# Patient Record
Sex: FEMALE | Race: WHITE | NOT HISPANIC OR LATINO | Employment: FULL TIME | URBAN - METROPOLITAN AREA
[De-identification: names, ages, dates, MRNs, and addresses within clinical notes are randomized per-mention and may not be internally consistent; named-entity substitution may affect disease eponyms.]

---

## 2021-10-12 ENCOUNTER — HOSPITAL ENCOUNTER (EMERGENCY)
Facility: CLINIC | Age: 56
Discharge: ANOTHER HOSPITAL | End: 2021-10-13
Attending: EMERGENCY MEDICINE | Admitting: EMERGENCY MEDICINE
Payer: COMMERCIAL

## 2021-10-12 DIAGNOSIS — F10.920 ALCOHOLIC INTOXICATION WITHOUT COMPLICATION (H): ICD-10-CM

## 2021-10-12 DIAGNOSIS — F10.99 ALCOHOL-RELATED DISORDER (H): ICD-10-CM

## 2021-10-12 DIAGNOSIS — F41.9 ANXIETY DISORDER: ICD-10-CM

## 2021-10-12 DIAGNOSIS — F32.A DEPRESSIVE DISORDER: ICD-10-CM

## 2021-10-12 LAB
ALCOHOL BREATH TEST: 0.22 (ref 0–0.01)
SARS-COV-2 RNA RESP QL NAA+PROBE: NEGATIVE

## 2021-10-12 PROCEDURE — 82075 ASSAY OF BREATH ETHANOL: CPT

## 2021-10-12 PROCEDURE — 250N000013 HC RX MED GY IP 250 OP 250 PS 637: Performed by: EMERGENCY MEDICINE

## 2021-10-12 PROCEDURE — C9803 HOPD COVID-19 SPEC COLLECT: HCPCS

## 2021-10-12 PROCEDURE — U0003 INFECTIOUS AGENT DETECTION BY NUCLEIC ACID (DNA OR RNA); SEVERE ACUTE RESPIRATORY SYNDROME CORONAVIRUS 2 (SARS-COV-2) (CORONAVIRUS DISEASE [COVID-19]), AMPLIFIED PROBE TECHNIQUE, MAKING USE OF HIGH THROUGHPUT TECHNOLOGIES AS DESCRIBED BY CMS-2020-01-R: HCPCS | Performed by: EMERGENCY MEDICINE

## 2021-10-12 PROCEDURE — 90791 PSYCH DIAGNOSTIC EVALUATION: CPT

## 2021-10-12 PROCEDURE — 99285 EMERGENCY DEPT VISIT HI MDM: CPT | Mod: 25

## 2021-10-12 RX ORDER — HYDROXYZINE HYDROCHLORIDE 25 MG/1
50 TABLET, FILM COATED ORAL ONCE
Status: COMPLETED | OUTPATIENT
Start: 2021-10-12 | End: 2021-10-12

## 2021-10-12 RX ADMIN — HYDROXYZINE HYDROCHLORIDE 50 MG: 25 TABLET, FILM COATED ORAL at 23:06

## 2021-10-12 ASSESSMENT — MIFFLIN-ST. JEOR: SCORE: 1658.63

## 2021-10-13 ENCOUNTER — HOSPITAL ENCOUNTER (INPATIENT)
Facility: CLINIC | Age: 56
LOS: 2 days | Discharge: PSYCHIATRIC OR SUBSTANCE USE FACILITY | DRG: 897 | End: 2021-10-15
Attending: PSYCHIATRY & NEUROLOGY | Admitting: PSYCHIATRY & NEUROLOGY
Payer: COMMERCIAL

## 2021-10-13 VITALS
SYSTOLIC BLOOD PRESSURE: 163 MMHG | BODY MASS INDEX: 40.97 KG/M2 | RESPIRATION RATE: 16 BRPM | HEIGHT: 64 IN | DIASTOLIC BLOOD PRESSURE: 89 MMHG | WEIGHT: 240 LBS | OXYGEN SATURATION: 100 % | TEMPERATURE: 98 F | HEART RATE: 102 BPM

## 2021-10-13 DIAGNOSIS — I10 PRIMARY HYPERTENSION: ICD-10-CM

## 2021-10-13 DIAGNOSIS — F33.0 MILD EPISODE OF RECURRENT MAJOR DEPRESSIVE DISORDER (H): ICD-10-CM

## 2021-10-13 DIAGNOSIS — F10.939 ALCOHOL WITHDRAWAL SYNDROME WITH COMPLICATION (H): ICD-10-CM

## 2021-10-13 DIAGNOSIS — G89.4 CHRONIC PAIN SYNDROME: Primary | ICD-10-CM

## 2021-10-13 LAB
ALBUMIN SERPL-MCNC: 3.5 G/DL (ref 3.4–5)
ALP SERPL-CCNC: 105 U/L (ref 40–150)
ALT SERPL W P-5'-P-CCNC: 73 U/L (ref 0–50)
AMPHETAMINES UR QL SCN: NORMAL
ANION GAP SERPL CALCULATED.3IONS-SCNC: 12 MMOL/L (ref 3–14)
AST SERPL W P-5'-P-CCNC: 47 U/L (ref 0–45)
BARBITURATES UR QL: NORMAL
BASOPHILS # BLD AUTO: 0 10E3/UL (ref 0–0.2)
BASOPHILS NFR BLD AUTO: 0 %
BENZODIAZ UR QL: NORMAL
BILIRUB SERPL-MCNC: 0.4 MG/DL (ref 0.2–1.3)
BUN SERPL-MCNC: 8 MG/DL (ref 7–30)
CALCIUM SERPL-MCNC: 8.5 MG/DL (ref 8.5–10.1)
CANNABINOIDS UR QL SCN: NORMAL
CHLORIDE BLD-SCNC: 99 MMOL/L (ref 94–109)
CO2 SERPL-SCNC: 25 MMOL/L (ref 20–32)
COCAINE UR QL: NORMAL
CREAT SERPL-MCNC: 0.76 MG/DL (ref 0.52–1.04)
EOSINOPHIL # BLD AUTO: 0 10E3/UL (ref 0–0.7)
EOSINOPHIL NFR BLD AUTO: 1 %
ERYTHROCYTE [DISTWIDTH] IN BLOOD BY AUTOMATED COUNT: 13.4 % (ref 10–15)
GFR SERPL CREATININE-BSD FRML MDRD: 87 ML/MIN/1.73M2
GLUCOSE BLD-MCNC: 83 MG/DL (ref 70–99)
HCG UR QL: NEGATIVE
HCT VFR BLD AUTO: 40 % (ref 35–47)
HGB BLD-MCNC: 13.2 G/DL (ref 11.7–15.7)
IMM GRANULOCYTES # BLD: 0 10E3/UL
IMM GRANULOCYTES NFR BLD: 1 %
LYMPHOCYTES # BLD AUTO: 1.7 10E3/UL (ref 0.8–5.3)
LYMPHOCYTES NFR BLD AUTO: 31 %
MCH RBC QN AUTO: 28 PG (ref 26.5–33)
MCHC RBC AUTO-ENTMCNC: 33 G/DL (ref 31.5–36.5)
MCV RBC AUTO: 85 FL (ref 78–100)
MONOCYTES # BLD AUTO: 0.4 10E3/UL (ref 0–1.3)
MONOCYTES NFR BLD AUTO: 8 %
NEUTROPHILS # BLD AUTO: 3.2 10E3/UL (ref 1.6–8.3)
NEUTROPHILS NFR BLD AUTO: 59 %
NRBC # BLD AUTO: 0 10E3/UL
NRBC BLD AUTO-RTO: 0 /100
OPIATES UR QL SCN: NORMAL
PCP UR QL SCN: NORMAL
PLATELET # BLD AUTO: 325 10E3/UL (ref 150–450)
POTASSIUM BLD-SCNC: 3.1 MMOL/L (ref 3.4–5.3)
PROT SERPL-MCNC: 7.6 G/DL (ref 6.8–8.8)
RBC # BLD AUTO: 4.72 10E6/UL (ref 3.8–5.2)
SODIUM SERPL-SCNC: 136 MMOL/L (ref 133–144)
WBC # BLD AUTO: 5.4 10E3/UL (ref 4–11)

## 2021-10-13 PROCEDURE — HZ2ZZZZ DETOXIFICATION SERVICES FOR SUBSTANCE ABUSE TREATMENT: ICD-10-PCS | Performed by: PSYCHIATRY & NEUROLOGY

## 2021-10-13 PROCEDURE — 250N000013 HC RX MED GY IP 250 OP 250 PS 637: Performed by: EMERGENCY MEDICINE

## 2021-10-13 PROCEDURE — H2032 ACTIVITY THERAPY, PER 15 MIN: HCPCS

## 2021-10-13 PROCEDURE — 85025 COMPLETE CBC W/AUTO DIFF WBC: CPT | Performed by: EMERGENCY MEDICINE

## 2021-10-13 PROCEDURE — 80307 DRUG TEST PRSMV CHEM ANLYZR: CPT | Performed by: EMERGENCY MEDICINE

## 2021-10-13 PROCEDURE — 36415 COLL VENOUS BLD VENIPUNCTURE: CPT | Performed by: EMERGENCY MEDICINE

## 2021-10-13 PROCEDURE — 128N000004 HC R&B CD ADULT

## 2021-10-13 PROCEDURE — 250N000013 HC RX MED GY IP 250 OP 250 PS 637: Performed by: PSYCHIATRY & NEUROLOGY

## 2021-10-13 PROCEDURE — 99222 1ST HOSP IP/OBS MODERATE 55: CPT | Mod: AI | Performed by: PSYCHIATRY & NEUROLOGY

## 2021-10-13 PROCEDURE — 81025 URINE PREGNANCY TEST: CPT | Performed by: EMERGENCY MEDICINE

## 2021-10-13 PROCEDURE — 80053 COMPREHEN METABOLIC PANEL: CPT | Performed by: EMERGENCY MEDICINE

## 2021-10-13 RX ORDER — ENALAPRIL MALEATE 20 MG/1
40 TABLET ORAL DAILY
Status: DISCONTINUED | OUTPATIENT
Start: 2021-10-14 | End: 2021-10-15 | Stop reason: HOSPADM

## 2021-10-13 RX ORDER — HYDROXYZINE HYDROCHLORIDE 25 MG/1
25 TABLET, FILM COATED ORAL EVERY 6 HOURS PRN
Status: DISCONTINUED | OUTPATIENT
Start: 2021-10-13 | End: 2021-10-13 | Stop reason: HOSPADM

## 2021-10-13 RX ORDER — POTASSIUM CHLORIDE 1500 MG/1
40 TABLET, EXTENDED RELEASE ORAL ONCE
Status: COMPLETED | OUTPATIENT
Start: 2021-10-13 | End: 2021-10-13

## 2021-10-13 RX ORDER — ACETAMINOPHEN 500 MG
1000 TABLET ORAL ONCE
Status: COMPLETED | OUTPATIENT
Start: 2021-10-13 | End: 2021-10-13

## 2021-10-13 RX ORDER — IBUPROFEN 600 MG/1
600 TABLET, FILM COATED ORAL ONCE
Status: COMPLETED | OUTPATIENT
Start: 2021-10-13 | End: 2021-10-13

## 2021-10-13 RX ORDER — HYDROCHLOROTHIAZIDE 25 MG/1
25 TABLET ORAL DAILY
Status: DISCONTINUED | OUTPATIENT
Start: 2021-10-14 | End: 2021-10-15 | Stop reason: HOSPADM

## 2021-10-13 RX ORDER — MULTIPLE VITAMINS W/ MINERALS TAB 9MG-400MCG
1 TAB ORAL DAILY
Status: DISCONTINUED | OUTPATIENT
Start: 2021-10-13 | End: 2021-10-15 | Stop reason: HOSPADM

## 2021-10-13 RX ORDER — ATENOLOL 50 MG/1
50 TABLET ORAL DAILY PRN
Status: DISCONTINUED | OUTPATIENT
Start: 2021-10-13 | End: 2021-10-15 | Stop reason: HOSPADM

## 2021-10-13 RX ORDER — TRAZODONE HYDROCHLORIDE 50 MG/1
50 TABLET, FILM COATED ORAL
Status: DISCONTINUED | OUTPATIENT
Start: 2021-10-13 | End: 2021-10-15 | Stop reason: HOSPADM

## 2021-10-13 RX ORDER — LORAZEPAM 1 MG/1
1 TABLET ORAL ONCE
Status: COMPLETED | OUTPATIENT
Start: 2021-10-13 | End: 2021-10-13

## 2021-10-13 RX ORDER — ACETAMINOPHEN 325 MG/1
650 TABLET ORAL EVERY 4 HOURS PRN
Status: DISCONTINUED | OUTPATIENT
Start: 2021-10-13 | End: 2021-10-15 | Stop reason: HOSPADM

## 2021-10-13 RX ORDER — HYDROCHLOROTHIAZIDE 25 MG/1
25 TABLET ORAL DAILY
Status: DISCONTINUED | OUTPATIENT
Start: 2021-10-13 | End: 2021-10-13 | Stop reason: HOSPADM

## 2021-10-13 RX ORDER — FOLIC ACID 1 MG/1
1 TABLET ORAL DAILY
Status: DISCONTINUED | OUTPATIENT
Start: 2021-10-13 | End: 2021-10-15 | Stop reason: HOSPADM

## 2021-10-13 RX ORDER — GABAPENTIN 300 MG/1
600 CAPSULE ORAL AT BEDTIME
Status: DISCONTINUED | OUTPATIENT
Start: 2021-10-13 | End: 2021-10-15 | Stop reason: HOSPADM

## 2021-10-13 RX ORDER — DIAZEPAM 5 MG
5-20 TABLET ORAL EVERY 30 MIN PRN
Status: DISCONTINUED | OUTPATIENT
Start: 2021-10-13 | End: 2021-10-15 | Stop reason: HOSPADM

## 2021-10-13 RX ORDER — LANOLIN ALCOHOL/MO/W.PET/CERES
100 CREAM (GRAM) TOPICAL DAILY
Status: DISCONTINUED | OUTPATIENT
Start: 2021-10-13 | End: 2021-10-15 | Stop reason: HOSPADM

## 2021-10-13 RX ORDER — LOPERAMIDE HCL 2 MG
2 CAPSULE ORAL 4 TIMES DAILY PRN
Status: DISCONTINUED | OUTPATIENT
Start: 2021-10-13 | End: 2021-10-15 | Stop reason: HOSPADM

## 2021-10-13 RX ORDER — CAPSAICIN 0.025 %
CREAM (GRAM) TOPICAL 2 TIMES DAILY PRN
Status: DISCONTINUED | OUTPATIENT
Start: 2021-10-13 | End: 2021-10-15 | Stop reason: HOSPADM

## 2021-10-13 RX ORDER — MAGNESIUM HYDROXIDE/ALUMINUM HYDROXICE/SIMETHICONE 120; 1200; 1200 MG/30ML; MG/30ML; MG/30ML
30 SUSPENSION ORAL EVERY 4 HOURS PRN
Status: DISCONTINUED | OUTPATIENT
Start: 2021-10-13 | End: 2021-10-15 | Stop reason: HOSPADM

## 2021-10-13 RX ORDER — ONDANSETRON 4 MG/1
4 TABLET, ORALLY DISINTEGRATING ORAL EVERY 6 HOURS PRN
Status: DISCONTINUED | OUTPATIENT
Start: 2021-10-13 | End: 2021-10-15 | Stop reason: HOSPADM

## 2021-10-13 RX ORDER — BACLOFEN 10 MG/1
10 TABLET ORAL 3 TIMES DAILY
Status: DISCONTINUED | OUTPATIENT
Start: 2021-10-13 | End: 2021-10-15 | Stop reason: HOSPADM

## 2021-10-13 RX ORDER — ENALAPRIL MALEATE 20 MG/1
40 TABLET ORAL DAILY
Status: DISCONTINUED | OUTPATIENT
Start: 2021-10-13 | End: 2021-10-13 | Stop reason: HOSPADM

## 2021-10-13 RX ORDER — LORAZEPAM 2 MG/1
2 TABLET ORAL ONCE
Status: COMPLETED | OUTPATIENT
Start: 2021-10-13 | End: 2021-10-13

## 2021-10-13 RX ORDER — HYDROXYZINE HYDROCHLORIDE 25 MG/1
25 TABLET, FILM COATED ORAL EVERY 4 HOURS PRN
Status: DISCONTINUED | OUTPATIENT
Start: 2021-10-13 | End: 2021-10-15 | Stop reason: HOSPADM

## 2021-10-13 RX ORDER — AMOXICILLIN 250 MG
1 CAPSULE ORAL 2 TIMES DAILY PRN
Status: DISCONTINUED | OUTPATIENT
Start: 2021-10-13 | End: 2021-10-15 | Stop reason: HOSPADM

## 2021-10-13 RX ADMIN — MULTIPLE VITAMINS W/ MINERALS TAB 1 TABLET: TAB at 13:51

## 2021-10-13 RX ADMIN — IBUPROFEN 600 MG: 600 TABLET ORAL at 02:43

## 2021-10-13 RX ADMIN — THIAMINE HCL TAB 100 MG 100 MG: 100 TAB at 13:51

## 2021-10-13 RX ADMIN — FOLIC ACID 1 MG: 1 TABLET ORAL at 13:51

## 2021-10-13 RX ADMIN — HYDROCHLOROTHIAZIDE 25 MG: 25 TABLET ORAL at 08:53

## 2021-10-13 RX ADMIN — ACETAMINOPHEN 650 MG: 325 TABLET, FILM COATED ORAL at 16:19

## 2021-10-13 RX ADMIN — LORAZEPAM 1 MG: 1 TABLET ORAL at 01:57

## 2021-10-13 RX ADMIN — DIAZEPAM 10 MG: 5 TABLET ORAL at 16:19

## 2021-10-13 RX ADMIN — POTASSIUM CHLORIDE 40 MEQ: 1500 TABLET, EXTENDED RELEASE ORAL at 06:18

## 2021-10-13 RX ADMIN — LORAZEPAM 2 MG: 2 TABLET ORAL at 06:18

## 2021-10-13 RX ADMIN — ACETAMINOPHEN 1000 MG: 500 TABLET, FILM COATED ORAL at 10:24

## 2021-10-13 RX ADMIN — ENALAPRIL MALEATE 40 MG: 20 TABLET ORAL at 08:53

## 2021-10-13 RX ADMIN — BACLOFEN 10 MG: 10 TABLET ORAL at 13:51

## 2021-10-13 RX ADMIN — BACLOFEN 10 MG: 10 TABLET ORAL at 20:36

## 2021-10-13 RX ADMIN — HYDROXYZINE HYDROCHLORIDE 25 MG: 25 TABLET, FILM COATED ORAL at 20:37

## 2021-10-13 RX ADMIN — GABAPENTIN 600 MG: 300 CAPSULE ORAL at 20:36

## 2021-10-13 RX ADMIN — HYDROXYZINE HYDROCHLORIDE 25 MG: 25 TABLET, FILM COATED ORAL at 16:19

## 2021-10-13 RX ADMIN — DIAZEPAM 10 MG: 5 TABLET ORAL at 13:52

## 2021-10-13 RX ADMIN — HYDROXYZINE HYDROCHLORIDE 25 MG: 25 TABLET ORAL at 08:53

## 2021-10-13 RX ADMIN — SERTRALINE HYDROCHLORIDE 50 MG: 50 TABLET ORAL at 08:53

## 2021-10-13 ASSESSMENT — ACTIVITIES OF DAILY LIVING (ADL)
ORAL_HYGIENE: INDEPENDENT
LAUNDRY: WITH SUPERVISION
DRESS: INDEPENDENT;WITH ASSISTANCE
HYGIENE/GROOMING: INDEPENDENT

## 2021-10-13 ASSESSMENT — MIFFLIN-ST. JEOR: SCORE: 1658.63

## 2021-10-13 NOTE — ED PROVIDER NOTES
I received patient in signout from Dr. Ibrahim.  Please refer to their complete H&P for further information.  Briefly, patient arrived intoxicated and wanted to get into detox.  At time of signout, sober reevaluation versus detox bed placement was pending.     We were able to obtain a detox bed at [facility] under the care of Dr. Cerda.  Transfer will be after 8 AM.  Patient was pacing and anxious and so she was treated with oral Ativan.  Appropriate paperwork was completed.  Signed out to my partner Dr. Monroe.       Melody Munguia MD  10/13/21 8314

## 2021-10-13 NOTE — PHARMACY-ADMISSION MEDICATION HISTORY
Pharmacy Medication History  Admission medication history interview status for the 10/12/2021  admission is complete. See EPIC admission navigator for prior to admission medications     Location of Interview: Patient room  Medication history sources: Patient and outside med report    Significant changes made to the medication list:  Discontinued Celebrex (patient stopped taking)    In the past week, patient estimated taking medication this percent of the time: greater than 90%    Additional medication history information:   N/A    Medication reconciliation completed by provider prior to medication history? No    Time spent in this activity: 20 mins    Prior to Admission medications    Medication Sig Last Dose Taking? Auth Provider   baclofen (LIORESAL) 10 MG tablet Take 10 mg by mouth 3 times daily  10/10/2021 at Unknown time Yes Reported, Patient   capsaicin (ZOSTRIX) 0.025 % external cream Apply topically 2 times daily as needed  prn at prn Yes Reported, Patient   enalapril (VASOTEC) 20 MG tablet Take 40 mg by mouth daily  10/10/2021 at am Yes Reported, Patient   gabapentin (NEURONTIN) 300 MG capsule Take 600 mg by mouth At Bedtime  10/10/2021 at hs Yes Reported, Patient   hydrochlorothiazide (HYDRODIURIL) 25 MG tablet Take 25 mg by mouth daily  10/10/2021 at am Yes Reported, Patient   hydrOXYzine (ATARAX) 25 MG tablet Take 25 mg by mouth 4 times daily as needed  prn at prn Yes Reported, Patient   sertraline (ZOLOFT) 50 MG tablet Take 50 mg by mouth daily  10/10/2021 at am Yes Reported, Patient   zolpidem (AMBIEN) 10 MG tablet TAKE 1 TABLET BY MOUTH EVERY DAY AT BEDTIME AS NEEDED prn at prn Yes Reported, Patient       The information provided in this note is only as accurate as the sources available at the time of update(s)

## 2021-10-13 NOTE — SAFE
Daniela RUCHI Aurelia  October 13, 2021  SAFE Note    Critical Safety Issues: alcohol intoxication        Current Suicidal Ideation/Self-Injurious Concerns/Methods: None - N/A      Current or Historical Inappropriate Sexual Behavior: No      Current or Historical Aggression/Homicidal Ideation: None - N/A      Triggers: alcohol use     Updated care team: Yes: spoke to Dr. Ibrahim and Nurse Kelly, informed patient has been referred to [facility] for a detox bed, CI will call with updates on wait times.      For additional details see full [facility] assessment.       Shiloh Donato, [facility]

## 2021-10-13 NOTE — PROGRESS NOTES
10/13/21 1333   Patient Belongings   Did you bring any home meds/supplements to the hospital?  No   Patient Belongings other (see comments)   Belongings Search Yes   Clothing Search Yes   Second Staff Jocelyn Scanlon     BIN: Comb, toothbrush, toothpaste, hair head band, lip blam, lotion     NO CELL PHONE, NO CHARGING CORD, NO WALLET, NO CASH    NOTHING IN SECURITY     A               Admission:  I am responsible for any personal items that are not sent to the safe or pharmacy.  [facility] is not responsible for loss, theft or damage of any property in my possession.    Signature:  _________________________________ Date: _______  Time: _____                                              Staff Signature:  ____________________________ Date: ________  Time: _____      2nd Staff person, if patient is unable/unwilling to sign:    Signature: ________________________________ Date: ________  Time: _____     Discharge:  [facility] has returned all of my personal belongings:    Signature: _________________________________ Date: ________  Time: _____                                          Staff Signature:  ____________________________ Date: ________  Time: _____

## 2021-10-13 NOTE — ED TRIAGE NOTES
56 year old female presents to the ED via ambulance for alcohol intoxication and depression. Pt has been drinking since 0930 this morning. Pt was recently at the [facility]. Pt lost her  abut a year ago and is very depressed about it. Pt's daughter called 911 when pt became too intoxicated to care for herself.

## 2021-10-13 NOTE — PLAN OF CARE
"Pt admitted to station 3AW, detox, 10/13/2021 about 1300 to be safely detoxified from alcohol using MSSA scoring and Valium. Pt reports she has been drinking daily for 3 weeks--\"whatever is cheapest\", hard alcohol. Pt is a 56 year old female who has history of anxiety, depression, HTN, and alcohol abuse. Pt was calm and cooperative with initial search, orientation to the unit, and admission profile completion.     Allergies: Hydrocodone   Legal status: Voluntary     ALT: 73 AST: 47   COVID, HCG and UTOX negative  K+ 3.1 and replaced in ED    Current stressors: right hip pain, needs surgery, but needs to be sober (ambulates with walker, frequent falls, reports this is \"bone on bone\")    -Worried about losing her job as she has not been going in, commenting that she is worried about missing more days of work while being here.    -Worried about losing her home as she has not been paying rent, has daughter and 6-month old grandbaby living with her and says she \"just needs to get her shit together\"     Pt has history of HTN and is prescribed antihypertensive medications, but has not been taking them, as she has been drinking and forgets to take her medications. Her BP was high while in the ED d/t this non-compliance and being in alcohol withdrawal. Upon admission to the unit, her BP was 154/83 with pulse of 94. She presented with tremors, clammy skin, was d/o to date X4 days, irritability, and physical agitation (unable to sit still). She scored 11 on MSSA and was medicated with 10mg Valium. She was medicated with Ativan 1mg at 0157, and 2 mg at 0618 while in ED. She has a history of anxiety and has been prescribed Zoloft 50 mg, which she reports being inconsistently compliant with. She reports she has been having panic attacks.     -Alcohol:   -Age at first use: 13, reports it was not a problem for her until this past year. It appears pt's drinking increased when her ex- past away last year    -Recent pattern of " "use: daily \"whatever is cheapest\", prefers vodka    -Last use: 10/12/2021 PM,  reports she drink 1.75 yesterday, ABT: 0.217 at 2238   -Inpatient treatments: pt recently completed [facility] day [facility] in June 2021, had about 6-8 weeks of sobriety   -Outpatient treatments: none   -This is patient's second time in detox   -Pt denies any history of SZ or DT r/t alcohol withdrawal  -Smoker: pt denies, she denies all other substance use as well.      Pt reports she doesn't sleep unless she drinks. She denies recent changes in her appetite or wt. She reports her pain in her right hip is constant and feels better when she drinks. She endorses anxiety and depression recently. She reports she has been feeling down and hopeless at times, but denies any suicidal thinking. She denies any history of  hospitalizations. She denies any history of suicidal thoughts or attempts. She denies any SIB history or current thinking/urges. She contracts for safety and reports she feels safe on the unit.     Precautions: w/d, fall, pt has walker for ambulation    "

## 2021-10-13 NOTE — ED PROVIDER NOTES
"  History     Chief Complaint:  Alcohol Intoxication     HPI   Daniela Moses is a 56 year old female with a history of alcohol dependence who presents via EMS with alcohol intoxication. Per the patient, she normally drinks about a handle of alcohol daily and has been drinking since 0930 today. Her daughter called EMS when the patient became too intoxicated to care for herself. She states she has had depression due to losing her  a year ago. She denies homicidal or suicidal ideation.    Review of Systems   Psychiatric/Behavioral: Positive for behavioral problems (depression, alcohol intoxication). Negative for suicidal ideas.   All other systems reviewed and are negative.      Allergies:  Hydrocodone-Acetaminophen    Medications:    Baclofen  Celecoxib  Vasotec  Gabapentin  Hydrochlorothiazide  Hydroxyzine  Sertraline  Ambien    Past Medical History:    Alcohol dependence  Alcoholic hepatitis  Hypertension  Chronic right hip pain  Osteoarthritis, right hip    Social History:  The patient was accompanied to the ER by EMS  Alcohol Use: Positive    Physical Exam     Patient Vitals for the past 24 hrs:   BP Temp Temp src Pulse Resp SpO2 Height Weight   10/13/21 0100 (!) 184/84 -- -- 90 16 97 % -- --   10/13/21 0015 (!) 189/86 -- -- 92 18 96 % -- --   10/12/21 2139 (!) 196/108 98  F (36.7  C) Temporal 96 16 97 % 1.626 m (5' 4\") 108.9 kg (240 lb)       Physical Exam  General: Resting on the bed.  Head: No obvious trauma to head.  Ears, Nose, Throat:  External ears normal.  Nose normal.   Eyes:  Conjunctivae clear.  Pupils are equal, round, and reactive.   CV: Regular rate and rhythm.  No murmurs.      Respiratory: Effort normal and breath sounds normal.  No wheezing or crackles.   Gastrointestinal: Soft.  No distension. There is no tenderness.    Neuro: Alert. Moving all extremities appropriately.  Normal speech.    Skin: Skin is warm and dry.  No rash noted.   Psych: depressed mood and affect. Behavior is normal. " denies SI or HI       Emergency Department Course     Laboratory:  Labs Ordered and Resulted from Time of ED Arrival Up to the Time of Departure from the ED   COMPREHENSIVE METABOLIC PANEL - Abnormal; Notable for the following components:       Result Value    Potassium 3.1 (*)     AST 47 (*)     ALT 73 (*)     All other components within normal limits   ALCOHOL BREATH TEST POCT - Abnormal; Notable for the following components:    Alcohol Breath Test 0.217 (*)     All other components within normal limits   COVID-19 VIRUS (CORONAVIRUS) BY PCR - Normal    Narrative:     Testing was performed using the Xpert Xpress SARS-CoV-2 Assay on the   Cepheid Gene-Xpert Instrument Systems. Additional information about   this Emergency Use Authorization (EUA) assay can be found via the Lab   Guide. This test should be ordered for the detection of SARS-CoV-2 in   individuals who meet SARS-CoV-2 clinical and/or epidemiological   criteria. Test performance is unknown in asymptomatic patients. This   test is for in vitro diagnostic use under the FDA EUA for   laboratories certified under CLIA to perform high complexity testing.   This test has not been FDA cleared or approved. A negative result   does not rule out the presence of PCR inhibitors in the specimen or   target RNA in concentration below the limit of detection for the   assay. The possibility of a false negative should be considered if   the patient's recent exposure or clinical presentation suggests   COVID-19. This test was validated by the  Laboratory. This laboratory is certified under the Clinical Laboratory Improvement Amendments of 1988 (CLIA-88) as qualified to perform high complexity laboratory testing.     DRUG ABUSE SCREEN 77 URINE (FL, RH, SH) - Normal   CBC WITH PLATELETS AND DIFFERENTIAL   HCG QUALITATIVE URINE   CBC WITH PLATELETS & DIFFERENTIAL    Narrative:     The following orders were created for panel order CBC with  platelets + differential.  Procedure                               Abnormality         Status                     ---------                               -----------         ------                     CBC with platelets and d...[[id number]]                      Final result                 Please view results for these tests on the individual orders.   URINE DRUGS OF ABUSE SCREEN    Narrative:     The following orders were created for panel order Urine Drugs of Abuse Screen.  Procedure                               Abnormality         Status                     ---------                               -----------         ------                     Drug abuse screen 77 uri...[[id number]]  Normal              Final result                 Please view results for these tests on the individual orders.       Emergency Department Course:    Reviewed:  I reviewed nursing notes, vitals, past history and care everywhere    Assessments:   I obtained history and examined the patient as noted above.     Consults:   0147 I consulted with DEC         Interventions:  Medications   hydrOXYzine (ATARAX) tablet 50 mg (50 mg Oral Given 10/12/21 2306)   LORazepam (ATIVAN) tablet 1 mg (1 mg Oral Given 10/13/21 0157)   ibuprofen (ADVIL/MOTRIN) tablet 600 mg (600 mg Oral Given 10/13/21 0243)       Disposition:  The patient was discharged to detox    Impression & Plan      Medical Decision Makin-year-old female with history of alcohol dependence presents with alcohol intoxication.  Vital signs hypertensive but patient is on antihypertensive medications.  Unclear if she has been taking these medications as prescribed.  No signs of withdrawal at this time.  Broad differential was pursued including a limited to mental health, suicidal or homicidal ideation, withdrawal, trauma, overdose, ingestion, toxic alcohols, etc.  Patient reports drinking alcohol.  No other ingestions.  No suicidal or homicidal ideation.  Urine drug screen negative.   CMP with mild elevation ALT and AST secondary to alcohol abuse.  CBC without anemia or leukocytosis.  Alcohol level positive.  Patient denies overdose or ingestion.  No signs of acute mental health decompensation.  Patient seen and evaluated by DEC.  Please see their note for further details.  Patient is willing to go to detox.  Plan for discharge to detox.  Patient was signed out to my partner Dr. Munguia pending transfer to detox.      Diagnosis:    ICD-10-CM    1. Alcoholic intoxication without complication (H)  F10.920    2. Alcohol-related disorder (H)  F10.99    3. Anxiety disorder  F41.9    4. Depressive disorder  F32.9        Discharge Medications:  New Prescriptions    No medications on file         Scribe Disclosure:  I, Prakash Andre, am serving as a scribe on 10/13/2021 at 1:58 AM to personally document services performed by Dr. Mita Ibrahim MD based on my observations and the provider's statements to me.     Mita Ibrahim MD  10/13/21 0322

## 2021-10-13 NOTE — ED NOTES
Patient requesting a wheelchair to go over to [facility] unit.  Patient states she has a hip problem and is unable to ambulate unassisted.  Discussed patients ambulatory difficulty with ED nurse, ED charge, [facility] charge and ED MD.  Decided to have patient to be assessed by LM and remain ED.

## 2021-10-13 NOTE — ED PROVIDER NOTES
Patient signed out to me at 6 AM pending transfer to [facility]. All medications ordered. Patient is hypertensive but has not had her home antihypertensives. These were ordered and given. Awaiting transfer to detox.    9:33 AM -patient's blood pressure improving following administration of home medications and benzodiazepines for ongoing treatment of alcohol withdrawal symptoms.  Most recent blood pressure is 168/93.  She has no other clinical signs or symptoms to suggest hypertensive emergency, endorgan dysfunction or other complications.  Blood pressure will continue to improve with treatment of her underlying alcohol withdrawal and reestablishment of her home medication regimen which she has not been taking.  This is the cause of her uncontrolled hypertension and should not further delay her transfer for definitive care. Acute sudden lowering of the blood pressure by >25% can increase her risk for hypoperfusion and stroke. If not for her alcohol withdrawal she would be discharged to home. Patient is medically clear for transfer to [facility].      Luigi Monroe MD  10/13/21 6345
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
## 2021-10-13 NOTE — ED NOTES
Bed: 2  Expected date:   Expected time:   Means of arrival:   Comments:   etoh, mental health issues, hold eta 2125

## 2021-10-13 NOTE — DISCHARGE INSTRUCTIONS
Please stop drinking alcohol.  This is harmful to your health.  Return to the ED if you want help with your alcohol use.  Follow up with your PCP in 1 week.      Discharge Instructions  Alcohol Intoxication    You have been seen today with alcohol intoxication. This means that you have enough alcohol in your system to impair your ability to mentally and physically function, perhaps to the extent that you were unable to care for yourself.    Generally, every Emergency Department visit should have a follow-up clinic visit with either a primary or a specialty clinic/provider. Please follow-up as instructed by your emergency provider today.    You may have come to the Emergency Department because of your intoxication, or for another reason, such as because of an injury. No matter what the case is, this visit is a  red flag  regarding alcohol use, and you should consider whether your drinking pattern is a problem for you.     You may be at risk for alcohol-related problems if:    Men: you drink more than 14 drinks per week, or more than 4 drinks per occasion.    Women: you drink more than 7 drinks per week or more than 3 drinks per occasion.    You have black-outs.  You do things you regret while drinking.  You have legal problems because of drinking.  You have job problems because of drinking (you call in sick to work because of drinking).    CAGE Questions  Have you ever felt you should cut down on your drinking?  Have people annoyed you by criticizing your drinking?  Have you ever felt bad or guilty about your drinking?  Have you ever had a drink first thing in the morning to steady your nerves or get rid of a hangover (eye opener)?    If you answer yes to any of the CAGE questions, you may have a problem with alcohol.      Return to the Emergency Department if:  You become shaky or tremble when you try to stop drinking.   You have severe abdominal pain (belly pain).   You have a seizure or pass out.    You vomit  (throw up) blood or have blood in your stool. This may be bright red or it may look like black coffee grounds.  You become lightheaded or faint.      For further help, contact:   Your caregiver.    Alcoholics Anonymous (AA).    [facility] Intergroup: [phone]  [facility] Central Office: [phone]   A drug or alcohol rehabilitation program.    You can get information on alcohol resources and groups by calling the number 211 or [phone] on any phone.     Seek medical care if:  You have persistent vomiting.   You have persistent pain in any part of your body.    You do not feel better after a few days.    If you were given a prescription for medicine here today, be sure to read all of the information (including the package insert) that comes with your prescription.  This will include important information about the medicine, its side effects, and any warnings that you need to know about.  The pharmacist who fills the prescription can provide more information and answer questions you may have about the medicine.  If you have questions or concerns that the pharmacist cannot address, please call or return to the Emergency Department.   Remember that you can always come back to the Emergency Department if you are not able to see your regular doctor in the amount of time listed above, if you get any new symptoms, or if there is anything that worries you.

## 2021-10-13 NOTE — PROGRESS NOTES
[place] Prescription Drug Control Monitoring Note:      NARX SCORES  Narcotic  090  Sedative  140  Stimulant  000  Explanation and GuidanceOVERDOSE RISK SCORE 310  (Range 000-999)  Explanation and Guidance  ADDITIONAL RISK INDICATORS (0)  Explanation and GuidanceThis NarxCare report is based on search criteria supplied and the data entered by the dispensing pharmacy. For more information about any prescription, please contact the dispensing pharmacy or the prescriber. NarxCare scores and reports are intended to aid, not replace, medical decision making. None of the information presented should be used as sole justification for providing or refusing to provide medications. The information on this report is not warranted as accurate or complete.  Graphs  RX GRAPH   Narcotic  Buprenorphine  Sedative  Stimulant  Other  All Prescribers  Prescribers  4 - Rohit Ly  3 - Melody Jarrett  2 - STORMY Hsu  1 - Dionna Linares  Timeline  10/13  2m  6m  1y  2y  Buprenorphine mg  16  4  0  28  Timeline  10/13  2m  6m  1y  2y  Morphine MgEq (MME)  200  80  0  320  Timeline  10/13  2m  6m  1y  2y  Lorazepam MgEq (LME)  10  2  0  18  Timeline  10/13  2m  6m  1y  2y  *Per CDC guidance, the MME conversion factors prescribed or provided as part of the medication-assisted treatment for opioid use disorder should not be used to benchmark against dosage thresholds meant for opioids prescribed for pain. Buprenorphine products have no agreed upon morphine equivalency, and as partial opioid agonists, are not expected to be associated with overdose risk in the same dose-dependent manner as doses for full agonist opioids. MME = morphine milligram equivalents. LME = Lorazepam milligram equivalents. mg = dose in milligrams.  Summary  Summary  Total Prescriptions:  7  Total Prescribers:  4  Total Pharmacies:  3  Narcotics* (excluding Buprenorphine)  Current Qty:  0  Current MME/day:  0.00  30 Day Avg  MME/day:  0.00  Sedatives*  Current Qty:  0  Current LME/day:  0.00  30 Day Avg LME/day:  0.00  Buprenorphine*  Current Qty:  0  Current mg/day:  0.00  30 Day Avg mg/day:  0.00  Rx Data  PRESCRIPTIONS  Total Prescriptions: 7  Total Private Pay: 0  Fill Date ID Written Sold Drug Qty Days Prescriber Rx # Pharmacy Refill Daily Dose * Pymt Type   09/15/2021 3 08/23/2021 09/23/2021 Gabapentin 300 Mg Capsule  60.00 30 [facility] [id number] [facility] (9321) 0/0  [place]  08/12/2021 1 08/12/2021 08/12/2021 Gabapentin 300 Mg Capsule  60.00 30 [place] [id number] [place]3829[place]  06/14/2021 2 06/14/2021 06/19/2021 Zolpidem Tartrate 10 Mg Tablet  30.00 30 Ga Gri [id number] [facility] (1388) 0/0 0.50 [place]  04/01/2021 2 04/01/2021 04/01/2021 Zolpidem Tartrate 10 Mg Tablet  30.00 30 Ga Gri [id number] [facility] (1388) 0/0 0.50 [place]  01/13/2021 2 01/13/2021 01/13/2021 Oxycodone-Acetaminophen 5-325  6.00 2 St Min [id number] [facility] (1388) 0/0 22.50 [place]  01/12/2021 2 01/12/2021 01/13/2021 Zolpidem Tartrate 10 Mg Tablet  30.00 30 Ga Gri [id number] [facility] (1388) 0/0 0.50 [place]  10/20/2020 3 10/20/2020 10/20/2020 Alprazolam 0.25 Mg Tablet  30.00 10 Ga Gri [id number] [facility] (9321) 0/0 1.50 [place]  *Per CDC guidance, the MME conversion factors prescribed or provided as part of the medication-assisted treatment for opioid use disorder should not be used to benchmark against dosage thresholds meant for opioids prescribed for pain. Buprenorphine products have no agreed upon morphine equivalency, and as partial opioid agonists, are not expected to be associated with overdose risk in the same dose-dependent manner as doses for full agonist opioids. MME = morphine milligram equivalents. LME = Lorazepam milligram equivalents. mg = dose in milligrams.  Providers  Total Providers: 4  Name Address [facility] Zipcode Phone  Melody Jarrett [address] [facility] [address] [phone]  Dionna Linares [address] [place] [address] [phone]  Eryn Fall MD [address] [facility] [address] [phone]  Rohit Salazar [place] [address] [facility] [address] [phone]  Pharmacies  Total Pharmacies: 3  Name Address [facility] State Zipcode Phone  [facility] [phone] [facility] [address] [phone]  [facility] [phone]) [address] [facility] [address] [phone]  [facility] ([phone]) [address] [facility] [address] [phone]  The report provided is based upon the search criteria entered and the corresponding data as it has been reported by dispenser(s). If erroneous information is identified or additional information is needed, please contact the dispenser or the prescriber provided on the report. Date Sold signifies the date the prescription was sold (left the pharmacy). The absence of Date Sold does not necessarily indicate the prescription was not dispensed. Fill Date represents the date the medication was filled or prepared by the pharmacy. Note, federal regulation (CFR Title 42: Part 2) requires patient consent prior to releasing certain patient data from federally funded opioid treatment programs (OTPs). As such, controlled substances dispensed from OTPs for medication-assisted treatment may not appear in the [place]  report. Morphine milligram equivalent (MME) conversion factors published by the CDC are used in the MME calculation. Per the CDC, the MME conversion factor is intended only for analytic purposes where prescription data are used to retrospectively calculate daily MME to inform analyses of risks associated with opioid prescribing. This value does not constitute clinical guidance or recommendations for converting patients from one form of opioid analgesic to another. Per the CDC, the conversion factors for drugs prescribed or provided, as part of medication-assisted treatment for opioid use disorder should not be used to benchmark  against MME dosage thresholds meant for opioids prescribed for pain. Buprenorphine products listed in the CDC s MME file do not have an associated conversion factor. Lastly, the CDC notes, in clinical practice, calculating MME for methadone often involves a sliding-scale approach, whereby the conversion factor increases with increasing dose. The conversion factor of 3 for methadone presented in this file could underestimate MME for a given patient. This report contains confidential information, including patient identifiers, and is not a public record. The information on this report must be treated as protected health information and is only to be disclosed to others as authorized by applicable state and Federal regulations.

## 2021-10-13 NOTE — ED NOTES
Patient is hypertensive, is on BP medications at home but has been noncompliant at home due to alcohol intoxication and forgetting to take medications.

## 2021-10-13 NOTE — PROGRESS NOTES
Pt medicated x 1 with valium 10 mg. Pt very restless on unit. Moving about on unit. Using walker.   Soft care mattress applied.   Pt provided call bell with instructions.   Pt assisted with locating phone numbers for family members.   Pt was admitted by acuity nurse Jocelyn.
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
## 2021-10-14 PROCEDURE — 250N000013 HC RX MED GY IP 250 OP 250 PS 637: Performed by: PSYCHIATRY & NEUROLOGY

## 2021-10-14 PROCEDURE — H0001 ALCOHOL AND/OR DRUG ASSESS: HCPCS | Performed by: COUNSELOR

## 2021-10-14 PROCEDURE — 99207 PR CONSULT E&M CHANGED TO SUBSEQUENT LEVEL: CPT | Performed by: PHYSICIAN ASSISTANT

## 2021-10-14 PROCEDURE — 250N000011 HC RX IP 250 OP 636: Performed by: PHYSICIAN ASSISTANT

## 2021-10-14 PROCEDURE — 99232 SBSQ HOSP IP/OBS MODERATE 35: CPT | Performed by: PHYSICIAN ASSISTANT

## 2021-10-14 PROCEDURE — 128N000004 HC R&B CD ADULT

## 2021-10-14 PROCEDURE — 99232 SBSQ HOSP IP/OBS MODERATE 35: CPT | Performed by: PSYCHIATRY & NEUROLOGY

## 2021-10-14 RX ORDER — HYDROXYZINE HYDROCHLORIDE 25 MG/1
25 TABLET, FILM COATED ORAL 2 TIMES DAILY PRN
Qty: 60 TABLET | Refills: 0 | Status: SHIPPED | OUTPATIENT
Start: 2021-10-14

## 2021-10-14 RX ORDER — CAPSAICIN 0.025 %
1 CREAM (GRAM) TOPICAL 2 TIMES DAILY PRN
Qty: 1 G | Refills: 0 | Status: ON HOLD | OUTPATIENT
Start: 2021-10-14 | End: 2022-01-13

## 2021-10-14 RX ORDER — ENALAPRIL MALEATE 20 MG/1
40 TABLET ORAL DAILY
Qty: 60 TABLET | Refills: 0 | Status: SHIPPED | OUTPATIENT
Start: 2021-10-14

## 2021-10-14 RX ORDER — GABAPENTIN 300 MG/1
600 CAPSULE ORAL AT BEDTIME
Qty: 60 CAPSULE | Refills: 0 | Status: SHIPPED | OUTPATIENT
Start: 2021-10-14

## 2021-10-14 RX ORDER — KETOROLAC TROMETHAMINE 15 MG/ML
30 INJECTION, SOLUTION INTRAMUSCULAR; INTRAVENOUS ONCE
Status: COMPLETED | OUTPATIENT
Start: 2021-10-14 | End: 2021-10-14

## 2021-10-14 RX ORDER — KETOROLAC TROMETHAMINE 10 MG/1
20 TABLET, FILM COATED ORAL ONCE
Status: DISCONTINUED | OUTPATIENT
Start: 2021-10-14 | End: 2021-10-14

## 2021-10-14 RX ORDER — TRAZODONE HYDROCHLORIDE 50 MG/1
50 TABLET, FILM COATED ORAL
Qty: 30 TABLET | Refills: 0 | Status: SHIPPED | OUTPATIENT
Start: 2021-10-14

## 2021-10-14 RX ORDER — HYDROCHLOROTHIAZIDE 25 MG/1
25 TABLET ORAL DAILY
Qty: 30 TABLET | Refills: 0 | Status: SHIPPED | OUTPATIENT
Start: 2021-10-14

## 2021-10-14 RX ORDER — FOLIC ACID 1 MG/1
1 TABLET ORAL DAILY
Qty: 30 TABLET | Refills: 0 | Status: ON HOLD | OUTPATIENT
Start: 2021-10-14 | End: 2022-01-13

## 2021-10-14 RX ORDER — MULTIPLE VITAMINS W/ MINERALS TAB 9MG-400MCG
1 TAB ORAL DAILY
Qty: 30 TABLET | Refills: 0 | Status: SHIPPED | OUTPATIENT
Start: 2021-10-14

## 2021-10-14 RX ORDER — BACLOFEN 10 MG/1
10 TABLET ORAL 3 TIMES DAILY
Qty: 90 TABLET | Refills: 0 | Status: SHIPPED | OUTPATIENT
Start: 2021-10-14

## 2021-10-14 RX ORDER — LANOLIN ALCOHOL/MO/W.PET/CERES
100 CREAM (GRAM) TOPICAL DAILY
Qty: 30 TABLET | Refills: 0 | Status: ON HOLD | OUTPATIENT
Start: 2021-10-14 | End: 2022-01-13

## 2021-10-14 RX ADMIN — ACETAMINOPHEN 650 MG: 325 TABLET, FILM COATED ORAL at 13:29

## 2021-10-14 RX ADMIN — GABAPENTIN 600 MG: 300 CAPSULE ORAL at 20:20

## 2021-10-14 RX ADMIN — HYDROXYZINE HYDROCHLORIDE 25 MG: 25 TABLET, FILM COATED ORAL at 13:29

## 2021-10-14 RX ADMIN — SERTRALINE HYDROCHLORIDE 50 MG: 50 TABLET ORAL at 09:14

## 2021-10-14 RX ADMIN — ENALAPRIL MALEATE 40 MG: 20 TABLET ORAL at 09:14

## 2021-10-14 RX ADMIN — THIAMINE HCL TAB 100 MG 100 MG: 100 TAB at 09:14

## 2021-10-14 RX ADMIN — ACETAMINOPHEN 650 MG: 325 TABLET, FILM COATED ORAL at 00:52

## 2021-10-14 RX ADMIN — HYDROXYZINE HYDROCHLORIDE 25 MG: 25 TABLET, FILM COATED ORAL at 09:20

## 2021-10-14 RX ADMIN — MULTIPLE VITAMINS W/ MINERALS TAB 1 TABLET: TAB at 09:14

## 2021-10-14 RX ADMIN — HYDROCHLOROTHIAZIDE 25 MG: 25 TABLET ORAL at 09:13

## 2021-10-14 RX ADMIN — BACLOFEN 10 MG: 10 TABLET ORAL at 20:20

## 2021-10-14 RX ADMIN — FOLIC ACID 1 MG: 1 TABLET ORAL at 09:14

## 2021-10-14 RX ADMIN — KETOROLAC TROMETHAMINE 30 MG: 15 INJECTION, SOLUTION INTRAMUSCULAR; INTRAVENOUS at 11:36

## 2021-10-14 RX ADMIN — BACLOFEN 10 MG: 10 TABLET ORAL at 09:14

## 2021-10-14 RX ADMIN — BACLOFEN 10 MG: 10 TABLET ORAL at 13:29

## 2021-10-14 ASSESSMENT — ACTIVITIES OF DAILY LIVING (ADL)
ORAL_HYGIENE: INDEPENDENT
DRESS: SCRUBS (BEHAVIORAL HEALTH)
LAUNDRY: WITH SUPERVISION
HYGIENE/GROOMING: INDEPENDENT

## 2021-10-14 NOTE — PLAN OF CARE
"Pt's MSSA was 11 and 5. Pt received Valium 10 mg. She was irritable and tense. She is interested in discharging home. She had family members calling yelling at staff saying \" She is not coming home she needs to go treatment and we are not picking her up\".  "

## 2021-10-14 NOTE — PLAN OF CARE
Problem: Substance Withdrawal  Goal: Substance Withdrawal  Description: Signs and symptoms of listed problems will be absent or manageable.    1. Detoxification from Alcohol using the [facility] valium protocol  2. Patient will complete assessment paperwork  3.  Patient will meet with  to discuss treatment and discharge planning  4. Physical examination and Lab evaluation by MD  5. Patients oral intake will be greater than 75 % of meals to meet estimated needs  6. Adequate fluid intake    Outcome: No Change   MSSA 4 & pt is now OOD. VSS. Pt was tearful. She stated she didn't possibly know where she would live. We will continue to assess.
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
## 2021-10-14 NOTE — PHARMACY-ADMISSION MEDICATION HISTORY
Please see Admission Medication History note completed on 10/13/21 under previous encounter at [facility] for information regarding prior to admission medications.    Nicollette McMann, PharmD  [facility] [place]  Emergency Department: Ascom *[id number]

## 2021-10-14 NOTE — PROGRESS NOTES
"[facility], [place]   Psychiatric Progress Note  Hospital Day: 1        Interim History:   The patient's care was discussed with the treatment team during the daily team meeting and/or staff's chart notes were reviewed.  Staff report patient has required a total of 20 mg of Valium since admission, most recently on 10/13 at 1619. Using hydroxyzine for anxiety. Patient did not report any acute medical concerns or side effects. No behavioral issues overnight, including violent or aggressive behaviors. Patient is not exhibiting signs/sx of psychosis or dirk. Patient is medication adherent. Patient is attending groups. Patient is sleeping relatively well. Slept 6.75 hours on the night shift. She continues to be hypertensive. Patient is eating adequately. Patient is attending to ADLs.    Upon interview, the patient reports that she is feeling \"fine,\" though notes that she is \"sore all over. I think it is from the bed.\" She denies any diaphoresis, tremor, nausea, vomiting, confusion. She is ambivalent about residential CD treatment, but is willing to consider exploring this option. Her mood is \"frustrated and angry. I am a control person and being out of control is hard.\" Fearful of losing her job and fearful she may need to postpone knee surgery. Discussed importance of maintaining sobriety prior to undergoing surgical procedure and recommendation for residential level of care. She is hoping that team can speak with her daughter and encourage her daughter to bring in some of patient's belongings while a dispo plan is being arranged.      Suicidal ideation: denies current or recent suicidal ideation or behaviors.    Homicidal ideation: denies current or recent homicidal ideation or behaviors.    Psychotic symptoms: Patient denies AH, VH, paranoia, delusions.     Medication side effects reported: No significant side effects.    Acute medical concerns: yes, as above. Requesting a medical bed. " "    Other issues reported by patient: Patient had no further questions or concerns.           Medications:       baclofen  10 mg Oral TID     enalapril  40 mg Oral Daily     folic acid  1 mg Oral Daily     gabapentin  600 mg Oral At Bedtime     hydrochlorothiazide  25 mg Oral Daily     multivitamin w/minerals  1 tablet Oral Daily     sertraline  50 mg Oral Daily     thiamine  100 mg Oral Daily          Allergies:     Allergies   Allergen Reactions     Hydrocodone Itching          Labs:   No results found for this or any previous visit (from the past 24 hour(s)).       Psychiatric Examination:     BP (!) 153/78   Pulse 96   Temp (!) 96.7  F (35.9  C) (Temporal)   Resp 16   Ht 1.626 m (5' 4\")   Wt 108.9 kg (240 lb)   SpO2 97%   BMI 41.20 kg/m    Weight is 240 lbs 0 oz  Body mass index is 41.2 kg/m .    Weight over time:  Vitals:    10/13/21 1238   Weight: 108.9 kg (240 lb)       Orthostatic Vitals     None            Cardiometabolic risk assessment. 10/14/21      Reviewed patient profile for cardiometabolic risk factors    Date taken /Value  REFERENCE RANGE   Abdominal Obesity  (Waist Circumference)   See nursing flowsheet Women ?35 in (88 cm)   Men ?40 in (102 cm)      Triglycerides  No results found for: TRIG    ?150 mg/dL (1.7 mmol/L) or current treatment for elevated triglycerides   HDL cholesterol  No results found for: HDL]   Women <50 mg/dL (1.3 mmol/L) in women or current treatment for low HDL cholesterol  Men <40 mg/dL (1 mmol/L) in men or current treatment for low HDL cholesterol     Fasting plasma glucose (FPG) Lab Results   Component Value Date    GLC 83 10/13/2021      FPG ?100 mg/dL (5.6 mmol/L) or treatment for elevated blood glucose   Blood pressure  BP Readings from Last 3 Encounters:   10/14/21 (!) 153/78   10/13/21 (!) 163/89    Blood pressure ?130/85 mmHg or treatment for elevated blood pressure   Family History  See family history     Appearance: awake, alert and slightly unkempt  Attitude: "  cooperative  Eye Contact:  better  Mood:  angry and frustrated  Affect:  appropriate and in normal range  Speech:  clear, coherent  Language: fluent and intact in English  Psychomotor, Gait, Musculoskeletal:  no evidence of tardive dyskinesia, dystonia, or tics. Gait is slowed and patient is using a walker.   Throught Process:  logical, linear and goal oriented  Associations:  no loose associations  Thought Content:  no evidence of suicidal ideation or homicidal ideation and no evidence of psychotic thought  Insight:  fair  Judgement:  fair  Oriented to:  time, person, and place  Attention Span and Concentration:  intact  Recent and Remote Memory:  intact  Fund of Knowledge:  appropriate    Clinical Global Impressions  First:  Considering your total clinical experience with this particular patient population, how severe are the patient's symptoms at this time?: 6 (10/13/21 1547)  Compared to the patient's condition at the START of treatment, this patient's condition is: 3 (10/13/21 1547)  Most recent:  Considering your total clinical experience with this particular patient population, how severe are the patient's symptoms at this time?: 6 (10/13/21 1547)  Compared to the patient's condition at the START of treatment, this patient's condition is: 3 (10/13/21 1547)           Precautions:     Behavioral Orders   Procedures     Code 1 - Restrict to Unit     Fall precautions     Routine Programming     As clinically indicated     Status 15     Every 15 minutes.     Withdrawal precautions          Diagnoses:     Alcohol Use Disorder, severe, in withdrawal, complicated by HTN and mild transaminitis  MDD, recurrent, mild  Generalized Anxiety Disorder  HTN  Mild transaminitis  Hypokalemia         Assessment & Plan:     Assessment and hospital summary:  This patient is a 56 year old  female with history of Alcohol Use Disorder who presented to ED seeking detox from alcohol. Family history significant for severe  alcoholism in both parents. Psychosocial stressors include: death of former  one year ago, possible loss of job, conflict with family. Patient was admitted on a voluntary basis to [facility] detox. Patient was started on MSSA protocol using Valium. Pt  does not have a history of DTs or seizures. Thiamine, folic acid, and multivitamin were ordered on admission. IM consult placed for co-management of care. Symptoms and presentation at this time is most consistent with Alcohol Use Disorder, severe, in withdrawal, complicated by HTN and mild transaminitis. I have discussed the risks, benefits, and alternatives of Zoloft and Gabapentin to target depression, anxiety, and initial insomnia. Patient will likely benefit from increased CD supports upon discharge. She is concerned about loss of employment if pursuing residential CD programming, though is now more open to exploring this option. CTC will be meeting with patient to discuss dispo plan. Will consider anti-craving medications prior to discharge. Inpatient psychiatric hospitalization is warranted at this time for safety, stabilization, and possible adjustment in medications.    Psychiatric treatment/inteventions:  Alcohol Withdrawal:  - Continue MSSA protocol using Valium for management of alcohol withdrawal  - Continue thiamine, folate, and multivitamin daily  - Consider anti-craving medications prior to discharge. Pt willing to review additional information about anti-craving medications prior to discharge. Likely not a good candidate for naltrexone as she is planning on undergoing surgical procedure within the next 30-60 days.   - Continue prn Zofran as needed for nausea   - Seizure and withdrawal precautions in place     Depression/Anxiety:  - Continue Zoloft 50 mg daily  - Continue Gabapentin 600 mg QHS  - Continue hydroxyzine 25 mg q4h prn for acute anxiety  - Continue Trazodone 50 mg at bedtime prn for sleep disturbances      Patient will be treated in  therapeutic milieu with appropriate individual and group therapies as described.        Medical treatment/interventions:  Medical concerns: Alcohol withdrawal, HTN, Hypokalemia  Consults: Routine IM consult placed. Appreciate assistance.  Labs: Reviewed. Will add potassium, folate, vitamin B12, TSH, lipid profile, and GGT for AM. Was not collected today.   Per IM note dated 10/14/21:  Assessment & Recommendations  Daniela Moses is a 56 year old woman with a past medical history of HTN and alcohol dependency who is admitted to [facility] with alcohol detox.        Alcohol Detox - Patient denies history of alcohol withdrawal seizures or DTs  -CIWA per protocol   -Management per psychiatry team.      Hypokalemia - Potassium 3.1 on arrival. Received 40 mEq replacement in ED.  Suspect 2/2 hydrochlorothiazide use.   -CMP in AM     Transaminitis - Mild LFT elevation, AST 47, ALT 73.  Likely 2/2 to above alcohol use.   -Repeat CMP in AM or within one week of discharge.      HTN - Continue PTA enalapril and hydrochlorothiazide      Depression/Anxiety - Management per psychiatry     Medicine will sign off, please page with any additional concerns.      Deana Caruso PA-C  Hospitalist Service     Legal Status: Voluntary     Safety Assessment:   Checks: Status 15  Pt has not required locked seclusion or restraints in the past 24 hours to maintain safety, please refer to RN documentation for further details.    The risks, benefits, alternatives and side effects have been discussed and are understood by the patient.        Disposition Plan     Reason for ongoing admission: requires detoxification from substance that poses a risk of bodily harm during withdrawal period  Discharge location: TBD. Patient would benefit from increased CD supports.   Discharge Medications: not ordered  Follow-up Appointments: not scheduled  Anticipated length of stay: 2-3 days       Entered by: Inna Medellin on 10/14/2021 at 2:11 PM     > 25 minutes  total time that was spent and over 50% of this time was spent in counseling and coordination of care.
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
## 2021-10-14 NOTE — PLAN OF CARE
Problem: Suicidal Behavior  Goal: Suicidal Behavior is Absent or Managed  Outcome: Improving    The patient scored a six and a four on the MSSA. She took Tylenol 650 mg PRN for hip pain rated as a four-of-ten with effect and slept for about 6.75 hours. She remains hypertensive.
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
## 2021-10-14 NOTE — CONSULTS
"[facility]    Internal Medicine Initial Consult       Date of Admission: 10/13/2021  Consult Requested by: Inna Medellin MD  Reason for Consult: Co-Management of Chronic Medical Conditions     Assessment & Recommendations  Daniela Moses is a 56 year old woman with a past medical history of HTN and alcohol dependency who is admitted to station 3A with alcohol detox.        Alcohol Detox - Patient denies history of alcohol withdrawal seizures or DTs  -CIWA per protocol   -Management per psychiatry team.     Hypokalemia - Potassium 3.1 on arrival. Received 40 mEq replacement in ED.  Suspect 2/2 hydrochlorothiazide use.   -CMP in AM    Transaminitis - Mild LFT elevation, AST 47, ALT 73.  Likely 2/2 to above alcohol use.   -Repeat CMP in AM or within one week of discharge.     HTN - Continue PTA enalapril and hydrochlorothiazide     Depression/Anxiety - Management per psychiatry    Medicine will sign off, please page with any additional concerns.     Deana Caruso PA-C  Hospitalist Service  Pager: [phone]  7a-6p M-F and 7a-3p weekends/holidays, through 10/17/21  Otherwise page job code [phone] (3B), [phone] (3A), or [phone] ([facility] and )  Text paging via [employer] is appreciated  ______________________________________________________________________    Reason for Admission  Alcohol detox    Chief Complaint   Alcohol intoxication    History of Present Illness   History is obtained from the patient and medical record.     Daniela Moses is a 56 year old year old woman with a PMH as listed above who is admitted to behavioral health for alcohol detox    Patient states she has been drinking a handle of hard liquor daily.  She states life has been difficult since her   one year ago and working from home during the pandemic.   She feels like she's at the \"end of the road\" with her problems.  She is afraid she will become homeless if she loses her job over her " alcohol dependency. She feels like she often has to take care of others and feels like there is no one to support her.     She confirms a PMH of HTN for which she takes enalapril and hydrochlorothiazide.  She endorses compliance.  She also endorses chronic right hip pain.     She denies fever, chills, chest pain, palpitations, SOB, cough, nausea, vomiting, abdominal pain, change in bowel or urinary habits.       Review of Systems   10 point ROS performed and negative unless otherwise noted in HPI     Past Medical History    I have reviewed this patient's medical history and updated it with pertinent information if needed.   Past Medical History:   Diagnosis Date     Alcohol dependence (H)      HTN (hypertension)         Past Surgical History   I have reviewed this patient's surgical history and updated it with pertinent information if needed.  No past surgical history on file.     Social History   Social History     Tobacco Use     Smoking status: Never Smoker     Smokeless tobacco: Never Used   Substance Use Topics     Alcohol use: Yes     Drug use: Not on file       Family History   I have reviewed this patient's family history and updated it with pertinent information if needed.   No family history on file.    Medications   Medications Prior to Admission   Medication Sig Dispense Refill Last Dose     baclofen (LIORESAL) 10 MG tablet Take 10 mg by mouth 3 times daily         capsaicin (ZOSTRIX) 0.025 % external cream Apply topically 2 times daily as needed         enalapril (VASOTEC) 20 MG tablet Take 40 mg by mouth daily         gabapentin (NEURONTIN) 300 MG capsule Take 600 mg by mouth At Bedtime         hydrochlorothiazide (HYDRODIURIL) 25 MG tablet Take 25 mg by mouth daily         hydrOXYzine (ATARAX) 25 MG tablet Take 25 mg by mouth 4 times daily as needed         sertraline (ZOLOFT) 50 MG tablet Take 50 mg by mouth daily         zolpidem (AMBIEN) 10 MG tablet TAKE 1 TABLET BY MOUTH EVERY DAY AT BEDTIME AS  "NEEDED          Allergies      Allergies   Allergen Reactions     Hydrocodone Itching       Physical Exam   BP (!) 179/83   Pulse 86   Temp 97.8  F (36.6  C) (Temporal)   Resp 16   Ht 1.626 m (5' 4\")   Wt 108.9 kg (240 lb)   SpO2 97%   BMI 41.20 kg/m     GENERAL: Cooperative, in NAD  HEENT: Anicteric sclera. Mucous membranes moist.   CV: RRR. S1, S2. No murmurs appreciated.   RESPIRATORY: Effort normal on room air. Lungs CTAB with no wheezing, rales, rhonchi.   GI: Abdomen soft, non distended, non tender.   NEUROLOGICAL: No focal deficits. Moves all extremities.   EXTREMITIES: No peripheral edema. Warm and well perfused.   SKIN: No jaundice. No rashes.     Data   Data reviewed today: I reviewed all medications, new labs and imaging results over the last 24 hours.   Results for MARTINEZ ALAMO (MRN [id number]) as of 10/14/2021 08:27   Ref. Range 10/13/2021 02:17   Sodium Latest Ref Range: 133 - 144 mmol/L 136   Potassium Latest Ref Range: 3.4 - 5.3 mmol/L 3.1 (L)   Chloride Latest Ref Range: 94 - 109 mmol/L 99   Carbon Dioxide Latest Ref Range: 20 - 32 mmol/L 25   Urea Nitrogen Latest Ref Range: 7 - 30 mg/dL 8   Creatinine Latest Ref Range: 0.52 - 1.04 mg/dL 0.76   GFR Estimate Latest Ref Range: >60 mL/min/1.73m2 87   Calcium Latest Ref Range: 8.5 - 10.1 mg/dL 8.5   Anion Gap Latest Ref Range: 3 - 14 mmol/L 12   Albumin Latest Ref Range: 3.4 - 5.0 g/dL 3.5   Protein Total Latest Ref Range: 6.8 - 8.8 g/dL 7.6   Bilirubin Total Latest Ref Range: 0.2 - 1.3 mg/dL 0.4   Alkaline Phosphatase Latest Ref Range: 40 - 150 U/L 105   ALT Latest Ref Range: 0 - 50 U/L 73 (H)   AST Latest Ref Range: 0 - 45 U/L 47 (H)   Glucose Latest Ref Range: 70 - 99 mg/dL 83   WBC Latest Ref Range: 4.0 - 11.0 10e3/uL 5.4   Hemoglobin Latest Ref Range: 11.7 - 15.7 g/dL 13.2   Hematocrit Latest Ref Range: 35.0 - 47.0 % 40.0   Platelet Count Latest Ref Range: 150 - 450 10e3/uL 325   RBC Count Latest Ref Range: 3.80 - 5.20 10e6/uL 4.72   MCV " Latest Ref Range: 78 - 100 fL 85   MCH Latest Ref Range: 26.5 - 33.0 pg 28.0   MCHC Latest Ref Range: 31.5 - 36.5 g/dL 33.0   RDW Latest Ref Range: 10.0 - 15.0 % 13.4   % Neutrophils Latest Units: % 59   % Lymphocytes Latest Units: % 31   % Monocytes Latest Units: % 8   % Eosinophils Latest Units: % 1   Absolute Basophils Latest Ref Range: 0.0 - 0.2 10e3/uL 0.0   % Basophils Latest Units: % 0   Absolute Eosinophils Latest Ref Range: 0.0 - 0.7 10e3/uL 0.0   Absolute Immature Granulocytes Latest Ref Range: <=0.0 10e3/uL 0.0   Absolute Lymphocytes Latest Ref Range: 0.8 - 5.3 10e3/uL 1.7   Absolute Monocytes Latest Ref Range: 0.0 - 1.3 10e3/uL 0.4   % Immature Granulocytes Latest Units: % 1   Absolute Neutrophils Latest Ref Range: 1.6 - 8.3 10e3/uL 3.2   Absolute NRBCs Latest Units: 10e3/uL 0.0   NRBCs per 100 WBC Latest Ref Range: <1 /100 0

## 2021-10-14 NOTE — PLAN OF CARE
Problem: Substance Withdrawal  Intervention: Substance Withdrawal  Recent Flowsheet Documentation  Taken 10/14/2021 1201 by Abena Hussein RN  Substance Withdrawal Interventions:    interventions implemented as appropriate    monitor substance withdrawal process    seizure precautions    encourage nutrition and hydration    maintain safety precautions    assist patient in completing CD Evaluation / Rule 25 Evaluation    assess patient response to medication  Alcohol Withdrawl Interventions:    monitor need for prn medication    provide emotional support    establish therapeutic relationship    assist with developing & utilizing healthy coping strategies    Pt being monitored for alcohol withdrawal. No medications for alcohol withdrawal this shift.   Pt out on unit.   Using walker.   Pt reporting right hip pain. Pt is in need of a hip replacement but states she has to get sober first.     Medicine ordered a IM dose of tordol for hip pain which was given.   Pt reports some relief after. Pt states the pain level is a 8-9 on a 0-10 severe scale.   Pt also given prn tylenol per request.     Pt requesting vistaril x 2 for anxiety.   Pt rates her anxiety level a 7 on a 0-10 severe scale.   Mild depression. Denies SI.     Pt reported many stressors today stating that she was kicked out of her apartment, her daughter does not want anything to do with her and she may have lost her job.     Pt accepting of a spiritual care consult.     Pt moved to room closer to nurses station and medical bed.     Additional labs ordered. Discharge plans pending.

## 2021-10-15 VITALS
DIASTOLIC BLOOD PRESSURE: 82 MMHG | HEART RATE: 78 BPM | OXYGEN SATURATION: 97 % | BODY MASS INDEX: 40.97 KG/M2 | HEIGHT: 64 IN | RESPIRATION RATE: 16 BRPM | WEIGHT: 240 LBS | SYSTOLIC BLOOD PRESSURE: 148 MMHG | TEMPERATURE: 98 F

## 2021-10-15 LAB
ALBUMIN SERPL-MCNC: 3 G/DL (ref 3.4–5)
ALP SERPL-CCNC: 73 U/L (ref 40–150)
ALT SERPL W P-5'-P-CCNC: 43 U/L (ref 0–50)
ANION GAP SERPL CALCULATED.3IONS-SCNC: 5 MMOL/L (ref 3–14)
AST SERPL W P-5'-P-CCNC: 25 U/L (ref 0–45)
BILIRUB SERPL-MCNC: 0.5 MG/DL (ref 0.2–1.3)
BUN SERPL-MCNC: 16 MG/DL (ref 7–30)
CALCIUM SERPL-MCNC: 9.1 MG/DL (ref 8.5–10.1)
CHLORIDE BLD-SCNC: 105 MMOL/L (ref 94–109)
CHOLEST SERPL-MCNC: 246 MG/DL
CO2 SERPL-SCNC: 31 MMOL/L (ref 20–32)
CREAT SERPL-MCNC: 0.86 MG/DL (ref 0.52–1.04)
FOLATE SERPL-MCNC: 8.4 NG/ML
GFR SERPL CREATININE-BSD FRML MDRD: 75 ML/MIN/1.73M2
GGT SERPL-CCNC: 45 U/L (ref 0–40)
GLUCOSE BLD-MCNC: 118 MG/DL (ref 70–99)
HDLC SERPL-MCNC: 88 MG/DL
LDLC SERPL CALC-MCNC: 138 MG/DL
NONHDLC SERPL-MCNC: 158 MG/DL
POTASSIUM BLD-SCNC: 4.2 MMOL/L (ref 3.4–5.3)
PROT SERPL-MCNC: 6.6 G/DL (ref 6.8–8.8)
SODIUM SERPL-SCNC: 141 MMOL/L (ref 133–144)
TRIGL SERPL-MCNC: 101 MG/DL
TSH SERPL DL<=0.005 MIU/L-ACNC: 1 MU/L (ref 0.4–4)
VIT B12 SERPL-MCNC: 418 PG/ML (ref 193–986)

## 2021-10-15 PROCEDURE — 36415 COLL VENOUS BLD VENIPUNCTURE: CPT | Performed by: PSYCHIATRY & NEUROLOGY

## 2021-10-15 PROCEDURE — 80053 COMPREHEN METABOLIC PANEL: CPT | Performed by: PHYSICIAN ASSISTANT

## 2021-10-15 PROCEDURE — 80061 LIPID PANEL: CPT | Performed by: PSYCHIATRY & NEUROLOGY

## 2021-10-15 PROCEDURE — 82977 ASSAY OF GGT: CPT | Performed by: PSYCHIATRY & NEUROLOGY

## 2021-10-15 PROCEDURE — 82607 VITAMIN B-12: CPT | Performed by: PSYCHIATRY & NEUROLOGY

## 2021-10-15 PROCEDURE — 84443 ASSAY THYROID STIM HORMONE: CPT | Performed by: PSYCHIATRY & NEUROLOGY

## 2021-10-15 PROCEDURE — 250N000013 HC RX MED GY IP 250 OP 250 PS 637: Performed by: PSYCHIATRY & NEUROLOGY

## 2021-10-15 PROCEDURE — 82746 ASSAY OF FOLIC ACID SERUM: CPT | Performed by: PSYCHIATRY & NEUROLOGY

## 2021-10-15 PROCEDURE — 99239 HOSP IP/OBS DSCHRG MGMT >30: CPT | Performed by: PSYCHIATRY & NEUROLOGY

## 2021-10-15 RX ADMIN — FOLIC ACID 1 MG: 1 TABLET ORAL at 09:04

## 2021-10-15 RX ADMIN — BACLOFEN 10 MG: 10 TABLET ORAL at 09:05

## 2021-10-15 RX ADMIN — MULTIPLE VITAMINS W/ MINERALS TAB 1 TABLET: TAB at 09:04

## 2021-10-15 RX ADMIN — ENALAPRIL MALEATE 40 MG: 20 TABLET ORAL at 09:05

## 2021-10-15 RX ADMIN — BACLOFEN 10 MG: 10 TABLET ORAL at 13:52

## 2021-10-15 RX ADMIN — ACETAMINOPHEN 650 MG: 325 TABLET, FILM COATED ORAL at 13:52

## 2021-10-15 RX ADMIN — HYDROCHLOROTHIAZIDE 25 MG: 25 TABLET ORAL at 09:04

## 2021-10-15 RX ADMIN — THIAMINE HCL TAB 100 MG 100 MG: 100 TAB at 09:05

## 2021-10-15 RX ADMIN — SERTRALINE HYDROCHLORIDE 50 MG: 50 TABLET ORAL at 09:05

## 2021-10-15 ASSESSMENT — ACTIVITIES OF DAILY LIVING (ADL)
ORAL_HYGIENE: INDEPENDENT
LAUNDRY: WITH SUPERVISION
HYGIENE/GROOMING: INDEPENDENT
DRESS: SCRUBS (BEHAVIORAL HEALTH)

## 2021-10-15 NOTE — DISCHARGE INSTRUCTIONS
Behavioral Discharge Planning and Instructions  THANK YOU FOR CHOOSING [address]  [phone]    Summary: You were admitted to Station 3A on 10/13/21 for detoxification from alcohol.  A medical exam was performed that included lab work. You have met with a  and opted to attend residential treatment at [facility].  Please take care and make your recovery a daily priority, Daniela!  It was a pleasure working with you and the entire treatment team here wishes you the very best in your recovery!     Recommendation:  Residential Treatment    Main Diagnoses:  Per Dr. Inna Medellin MD;  Alcohol Use Disorder, severe, in withdrawal, complicated by HTN and mild transaminitis  Major Depressive Disorder, recurrent, mild  Generalized Anxiety Disorder  Hypertension  Mild transaminitis  Hypokalemia    Major Treatments, Procedures and Findings:  Your alcohol withdrawal was treated with valium and ativan using the Modified Selective Severity Assessment (MSSA) protocol  You had a chemical dependency assessment. You had labs drawn and those results were reviewed with you. Please take a copy of your lab work with you to your next primary care provider appointment.      You plans to follow up with your primary MD regarding your hip discomfort.     Your family is to deliver your walker to the program.   Symptoms to Report:  If you experience more anxiety, confusion, sleeplessness, deep sadness or thoughts of suicide, notify your treatment team or notify your primary care provider. IF ANY OF THE SYMPTOMS YOU ARE EXPERIENCING ARE A MEDICAL EMERGENCY CALL 911 IMMEDIATELY.     Lifestyle Adjustment: Health Action Plan:  1.Create a daily schedule  2. Eat Healthy  3. Plan Enjoyable Sober Activities  4. Use Problem Solving Skills and Deal with Issues as they Arise.   5. Be Physically Active  6. Take your medications as prescribed  7. Get enough restful sleep  8. Practice Relaxation  9. Spend time with Supportive  People  10. No use of alcohol, illegal drugs or addictive medications other than what is currently prescribed.   11.AA, NA Sponsor are excellent resources for support  12. Explore how  you can utilize spirituality in your recovery      Disposition: [facility]    Facts about COVID19 at www.cdc.gov/COVID19 and www.[place].gov/covid19    Keeping hands clean is one of the most important steps we can take to avoid getting sick and spreading germs to others.  Please wash your hands frequently and lather with soap for at least 20 seconds!              Medical Follow-Up    [facility] in [place], [place]  COVID-19 info: [facility] [employer]  Get online care: [facility] [employer]  Address: [address] [place] [address] [address], [place] [address]  Hours:   Open ? Closes 5PM  Phone: [phone]  Appointments: [facility] [employer]    You are aware you should make a follow up appointment with your primary care doctor for medical and medication management      Treatment Follow-Up:  [facility]  Admission: ***  [address] [place] [address]  Phone: [phone]       Recovery apps for your phone to locate current in person and zoom recovery meetings  Pink [place] - meeting leila  AA  - meeting leila  Meeting guide - meeting leila  Quick NA meeting - meeting leila  Nicole- has various apps    Resources:  *due to covid-19 most AA/NA meetings are being held online*  AA meetings online search for them at: https://aa-intergroup.org (worldwide meeting listings)  AA meetings for [place] area can be found online at: https://a[place].org (click local online meetings listings)  NA meetings for [place] area can be found online at: https://www.nam[place].org  (click find a meeting)  AA and NA Sponsors are excellent resources for support and you can find one at any support group meeting.   Alcoholics Anonymous (https://aa.org/): for information 24 hours/day  AA Intergroup service office in [place]  (http://www.aastpaul.org/) 626.231.2825  AA Intergroup service office in [facility]: [phone]. (http://www.aamin[place].org/)  Narcotics Anonymous (www.nam[place].org) (216) 540-2990  https://aafairviewriverside.org/meetings  SMART Recovery - self management for addiction recovery:  www.smartrecAllen County Hospitaly.org  Pathways ~ A Health Crisis Resource & Support Center:  333.403.1726.  https://prescribetoprevent.org/patient-education/videos/  http://www.harmreduction.org  [facility] [place] 618-052-8055  Support Group:  ANTONETTE/LUCIA and Sponsor/support.  National [place] on Mental Illness (www.[place].yandel.org): 190.545.7895 or 677-842-4150.  Alcoholics Anonymous (www.alcoholics-anonymous.org): Check your phone book for your local chapter.  Suicide Awareness Voices of Education (SAVE) (www.save.org): 353-669-SMPE (5683)  National Suicide Prevention Line (www.mentalhealthmn.org): 189-598-EQRI (7049)  Mental Health Consumer/Survivor Network of [place] (www.mhcsn.net): 330.679.7980 or 435-973-4405  Mental Health Association of [place] (www.mentalhealth.org): 979.644.4498 or 176-741-4056   Substance Abuse and Mental Health Services (www.samhsa.gov)  [place] Opioid Prevention Coalition: www.opioidcoalition.org    [place] Recovery Connection ([facility])  [facility] connects people seeking recovery to resources that help foster and sustain long-term recovery.  Whether you are seeking resources for treatment, transportation, housing, job training, education, health care or other pathways to recovery, [facility] is a great place to start.  749.633.8168.  www.[place]arec[place]ric.org    Great Pod casts for nutrition and wellness  Listen on Apple Podcasts  Dishing Up Nutrition   [employer]   Nutrition       Understand the connection between what you eat and how you feel. Hosted by licensed nutritionists and dietitians from [employer] we share practical, real-life solutions for healthier living through nutrition.      General Medication Instructions:   See your medication sheet(s) for instructions.   Take all medications as prescribed.  Make no changes unless your primary care provider suggests them.   Go to all your primary care provider visits.  Be sure to have all your required lab tests. This way, your medicines can be refilled on time.  Do not use any forms of alcohol.    Please Note:  If you have any questions at anytime after you are discharged please call [facility] detox unit 3AW at [phone].  [facility], Behavioral Intake [phone]  Medical Records call [phone]  Outpatient Behavioral Intake call [phone]  LP+ Wait List/Bed Availability call [phone]    Please remember to take all of your behavioral discharge planning and lab paperwork to any follow up appointments, it contains your lab results, diagnosis, medication list and discharge recommendations.      THANK YOU FOR CHOOSING [facility]

## 2021-10-15 NOTE — PLAN OF CARE
"  Problem: Behavioral Health Plan of Care  Goal: Plan of Care Review  Outcome: Adequate for Discharge  Flowsheets (Taken 10/15/2021 2725)  Plan of Care Reviewed With: patient  Patient Agreement with Plan of Care: agrees   Pt's alcohol withdrawal is complete. Pt is being discharged to [facility] via cab.   Pt's family will bring her walker to the program per unit  rashard.     Pt given tylenol prn for hip pain. Pt rates her pain a 8 on a 0-10 severe scale.   Pt reports her mood as depressed and anxious. Pt stated \"I'm pretty depressed.\" Denies SI. Report anxiety.   Pt upset with family as she feels she has taken care of them for many things and now she feels they have not been supportive of her.   Affect flat.   Appetite and sleep fair.     Discussed ways to use spirituality and AA in her recovery.      BP has been elevated. Discussed need to continue to monitor BP Handouts on HTN and dash diet provided.     Pt's provided discharge medications.     Pt states she will make a follow up medical appointment.     Reviewed discharge instructions and labs with patient. Copy of labs for patient provided.     Pt has been using walker here on unit. Gait steady. Pt taking soft care mattress with her.     See discharge instructions.     "

## 2021-10-15 NOTE — PROGRESS NOTES
Brief Medicine Progress Note    Internal Medicine following up on repeat labs for transaminitis.  CMP today with normalization of her liver enzymes.  AST 25, ALT 43.      Internal medicine will sign off.  Please do not hesitate to call with any questions or concerns.     Deana Caruso PA-C  Hospitalist Service  [facility]  Pager: [phone]

## 2021-10-15 NOTE — PROGRESS NOTES
Pt scheduled for intake at [facility] today before 3 pm.  Cab will be arranged for Pt to go door to door.  MD and RN informed.  Family will deliver Pt's walker to facility.

## 2021-10-15 NOTE — PROGRESS NOTES
Left voicemail message with [facility] to schedule intake. Waiting for return call. Spoke with abdulaziz's daughter Ashli to provide update. Waiting for return call to coordinate services. AVS updated.

## 2021-10-17 ENCOUNTER — HEALTH MAINTENANCE LETTER (OUTPATIENT)
Age: 56
End: 2021-10-17

## 2022-01-10 ENCOUNTER — HOSPITAL ENCOUNTER (INPATIENT)
Facility: CLINIC | Age: 57
LOS: 2 days | Discharge: NURSING, REHAB OR LONG-TERM CARE | DRG: 896 | End: 2022-01-13
Attending: EMERGENCY MEDICINE | Admitting: INTERNAL MEDICINE
Payer: COMMERCIAL

## 2022-01-10 DIAGNOSIS — U07.1 INFECTION DUE TO 2019 NOVEL CORONAVIRUS: ICD-10-CM

## 2022-01-10 DIAGNOSIS — F10.939 ALCOHOL WITHDRAWAL SYNDROME WITH COMPLICATION (H): ICD-10-CM

## 2022-01-10 DIAGNOSIS — F10.920 ALCOHOLIC INTOXICATION WITHOUT COMPLICATION (H): ICD-10-CM

## 2022-01-10 DIAGNOSIS — F10.930 ALCOHOL WITHDRAWAL SYNDROME WITHOUT COMPLICATION (H): ICD-10-CM

## 2022-01-10 DIAGNOSIS — I10 HYPERTENSION, UNSPECIFIED TYPE: Primary | ICD-10-CM

## 2022-01-10 LAB
ALBUMIN SERPL-MCNC: 3.1 G/DL (ref 3.4–5)
ALP SERPL-CCNC: 89 U/L (ref 40–150)
ALT SERPL W P-5'-P-CCNC: 51 U/L (ref 0–50)
ANION GAP SERPL CALCULATED.3IONS-SCNC: 11 MMOL/L (ref 3–14)
AST SERPL W P-5'-P-CCNC: 70 U/L (ref 0–45)
BASOPHILS # BLD AUTO: 0 10E3/UL (ref 0–0.2)
BASOPHILS NFR BLD AUTO: 0 %
BILIRUB SERPL-MCNC: 0.3 MG/DL (ref 0.2–1.3)
BUN SERPL-MCNC: 9 MG/DL (ref 7–30)
CALCIUM SERPL-MCNC: 8.4 MG/DL (ref 8.5–10.1)
CHLORIDE BLD-SCNC: 108 MMOL/L (ref 94–109)
CO2 SERPL-SCNC: 24 MMOL/L (ref 20–32)
CREAT SERPL-MCNC: 0.52 MG/DL (ref 0.52–1.04)
EOSINOPHIL # BLD AUTO: 0.1 10E3/UL (ref 0–0.7)
EOSINOPHIL NFR BLD AUTO: 1 %
ERYTHROCYTE [DISTWIDTH] IN BLOOD BY AUTOMATED COUNT: 15.1 % (ref 10–15)
ETHANOL SERPL-MCNC: 0.41 G/DL
GFR SERPL CREATININE-BSD FRML MDRD: >90 ML/MIN/1.73M2
GLUCOSE BLD-MCNC: 115 MG/DL (ref 70–99)
HCT VFR BLD AUTO: 42.4 % (ref 35–47)
HGB BLD-MCNC: 14.2 G/DL (ref 11.7–15.7)
IMM GRANULOCYTES # BLD: 0 10E3/UL
IMM GRANULOCYTES NFR BLD: 1 %
LYMPHOCYTES # BLD AUTO: 2.5 10E3/UL (ref 0.8–5.3)
LYMPHOCYTES NFR BLD AUTO: 42 %
MCH RBC QN AUTO: 28.2 PG (ref 26.5–33)
MCHC RBC AUTO-ENTMCNC: 33.5 G/DL (ref 31.5–36.5)
MCV RBC AUTO: 84 FL (ref 78–100)
MONOCYTES # BLD AUTO: 0.3 10E3/UL (ref 0–1.3)
MONOCYTES NFR BLD AUTO: 5 %
NEUTROPHILS # BLD AUTO: 3 10E3/UL (ref 1.6–8.3)
NEUTROPHILS NFR BLD AUTO: 51 %
NRBC # BLD AUTO: 0 10E3/UL
NRBC BLD AUTO-RTO: 0 /100
PLATELET # BLD AUTO: 237 10E3/UL (ref 150–450)
POTASSIUM BLD-SCNC: 3.7 MMOL/L (ref 3.4–5.3)
PROT SERPL-MCNC: 7.3 G/DL (ref 6.8–8.8)
RBC # BLD AUTO: 5.04 10E6/UL (ref 3.8–5.2)
SARS-COV-2 RNA RESP QL NAA+PROBE: POSITIVE
SODIUM SERPL-SCNC: 143 MMOL/L (ref 133–144)
WBC # BLD AUTO: 5.9 10E3/UL (ref 4–11)

## 2022-01-10 PROCEDURE — 82077 ASSAY SPEC XCP UR&BREATH IA: CPT | Performed by: EMERGENCY MEDICINE

## 2022-01-10 PROCEDURE — G0378 HOSPITAL OBSERVATION PER HR: HCPCS

## 2022-01-10 PROCEDURE — 85025 COMPLETE CBC W/AUTO DIFF WBC: CPT | Performed by: EMERGENCY MEDICINE

## 2022-01-10 PROCEDURE — 87635 SARS-COV-2 COVID-19 AMP PRB: CPT | Performed by: EMERGENCY MEDICINE

## 2022-01-10 PROCEDURE — 99285 EMERGENCY DEPT VISIT HI MDM: CPT | Performed by: EMERGENCY MEDICINE

## 2022-01-10 PROCEDURE — 36415 COLL VENOUS BLD VENIPUNCTURE: CPT | Performed by: EMERGENCY MEDICINE

## 2022-01-10 PROCEDURE — 99219 PR INITIAL OBSERVATION CARE,LEVEL II: CPT | Performed by: EMERGENCY MEDICINE

## 2022-01-10 PROCEDURE — 80053 COMPREHEN METABOLIC PANEL: CPT | Performed by: EMERGENCY MEDICINE

## 2022-01-10 PROCEDURE — C9803 HOPD COVID-19 SPEC COLLECT: HCPCS | Performed by: EMERGENCY MEDICINE

## 2022-01-10 NOTE — ED TRIAGE NOTES
Pt dropped off by family for alcohol problem and seeking detox. Pt had fallen asleep in wheelchair. Pt arouses but will not follow commands at this time. VSS.

## 2022-01-11 PROBLEM — F10.930 ALCOHOL WITHDRAWAL SYNDROME WITHOUT COMPLICATION (H): Status: ACTIVE | Noted: 2022-01-11

## 2022-01-11 PROBLEM — F10.939 ALCOHOL WITHDRAWAL (H): Status: ACTIVE | Noted: 2022-01-11

## 2022-01-11 LAB
ALBUMIN SERPL-MCNC: 3.2 G/DL (ref 3.4–5)
ALP SERPL-CCNC: 98 U/L (ref 40–150)
ALT SERPL W P-5'-P-CCNC: 52 U/L (ref 0–50)
ANION GAP SERPL CALCULATED.3IONS-SCNC: 11 MMOL/L (ref 3–14)
AST SERPL W P-5'-P-CCNC: 56 U/L (ref 0–45)
BILIRUB SERPL-MCNC: 0.9 MG/DL (ref 0.2–1.3)
BUN SERPL-MCNC: 15 MG/DL (ref 7–30)
CALCIUM SERPL-MCNC: 9 MG/DL (ref 8.5–10.1)
CHLORIDE BLD-SCNC: 96 MMOL/L (ref 94–109)
CO2 SERPL-SCNC: 26 MMOL/L (ref 20–32)
CREAT SERPL-MCNC: 0.68 MG/DL (ref 0.52–1.04)
D DIMER PPP FEU-MCNC: 0.9 UG/ML FEU (ref 0–0.5)
GFR SERPL CREATININE-BSD FRML MDRD: >90 ML/MIN/1.73M2
GLUCOSE BLD-MCNC: 115 MG/DL (ref 70–99)
HOLD SPECIMEN: NORMAL
MAGNESIUM SERPL-MCNC: 1.8 MG/DL (ref 1.6–2.3)
PHOSPHATE SERPL-MCNC: 3.1 MG/DL (ref 2.5–4.5)
POTASSIUM BLD-SCNC: 3.7 MMOL/L (ref 3.4–5.3)
PROT SERPL-MCNC: 7.5 G/DL (ref 6.8–8.8)
SODIUM SERPL-SCNC: 133 MMOL/L (ref 133–144)

## 2022-01-11 PROCEDURE — 99217 PR OBSERVATION CARE DISCHARGE: CPT | Performed by: EMERGENCY MEDICINE

## 2022-01-11 PROCEDURE — 99222 1ST HOSP IP/OBS MODERATE 55: CPT | Mod: AI | Performed by: INTERNAL MEDICINE

## 2022-01-11 PROCEDURE — 80053 COMPREHEN METABOLIC PANEL: CPT | Performed by: INTERNAL MEDICINE

## 2022-01-11 PROCEDURE — 250N000013 HC RX MED GY IP 250 OP 250 PS 637: Performed by: EMERGENCY MEDICINE

## 2022-01-11 PROCEDURE — 83735 ASSAY OF MAGNESIUM: CPT | Performed by: INTERNAL MEDICINE

## 2022-01-11 PROCEDURE — 120N000002 HC R&B MED SURG/OB UMMC

## 2022-01-11 PROCEDURE — 84100 ASSAY OF PHOSPHORUS: CPT | Performed by: INTERNAL MEDICINE

## 2022-01-11 PROCEDURE — 250N000013 HC RX MED GY IP 250 OP 250 PS 637: Performed by: INTERNAL MEDICINE

## 2022-01-11 PROCEDURE — 36415 COLL VENOUS BLD VENIPUNCTURE: CPT | Performed by: INTERNAL MEDICINE

## 2022-01-11 PROCEDURE — 99223 1ST HOSP IP/OBS HIGH 75: CPT | Mod: FS | Performed by: PHYSICIAN ASSISTANT

## 2022-01-11 PROCEDURE — G0378 HOSPITAL OBSERVATION PER HR: HCPCS

## 2022-01-11 PROCEDURE — 85379 FIBRIN DEGRADATION QUANT: CPT | Performed by: INTERNAL MEDICINE

## 2022-01-11 PROCEDURE — HZ2ZZZZ DETOXIFICATION SERVICES FOR SUBSTANCE ABUSE TREATMENT: ICD-10-PCS | Performed by: INTERNAL MEDICINE

## 2022-01-11 RX ORDER — FLUMAZENIL 0.1 MG/ML
0.2 INJECTION, SOLUTION INTRAVENOUS
Status: DISCONTINUED | OUTPATIENT
Start: 2022-01-11 | End: 2022-01-13 | Stop reason: HOSPADM

## 2022-01-11 RX ORDER — SODIUM CHLORIDE, SODIUM LACTATE, POTASSIUM CHLORIDE, CALCIUM CHLORIDE 600; 310; 30; 20 MG/100ML; MG/100ML; MG/100ML; MG/100ML
INJECTION, SOLUTION INTRAVENOUS CONTINUOUS
Status: DISCONTINUED | OUTPATIENT
Start: 2022-01-11 | End: 2022-01-12

## 2022-01-11 RX ORDER — AMOXICILLIN 250 MG
2 CAPSULE ORAL 2 TIMES DAILY PRN
Status: DISCONTINUED | OUTPATIENT
Start: 2022-01-11 | End: 2022-01-13 | Stop reason: HOSPADM

## 2022-01-11 RX ORDER — BACLOFEN 10 MG/1
10 TABLET ORAL 2 TIMES DAILY
Status: DISCONTINUED | OUTPATIENT
Start: 2022-01-11 | End: 2022-01-13 | Stop reason: HOSPADM

## 2022-01-11 RX ORDER — IBUPROFEN 200 MG
200 TABLET ORAL EVERY 6 HOURS PRN
Status: DISCONTINUED | OUTPATIENT
Start: 2022-01-11 | End: 2022-01-13 | Stop reason: HOSPADM

## 2022-01-11 RX ORDER — OLANZAPINE 5 MG/1
5-10 TABLET, ORALLY DISINTEGRATING ORAL EVERY 6 HOURS PRN
Status: DISCONTINUED | OUTPATIENT
Start: 2022-01-11 | End: 2022-01-13 | Stop reason: HOSPADM

## 2022-01-11 RX ORDER — DIAZEPAM 10 MG/2ML
5-10 INJECTION, SOLUTION INTRAMUSCULAR; INTRAVENOUS EVERY 30 MIN PRN
Status: DISCONTINUED | OUTPATIENT
Start: 2022-01-11 | End: 2022-01-13 | Stop reason: HOSPADM

## 2022-01-11 RX ORDER — MULTIPLE VITAMINS W/ MINERALS TAB 9MG-400MCG
1 TAB ORAL DAILY
Status: DISCONTINUED | OUTPATIENT
Start: 2022-01-12 | End: 2022-01-13 | Stop reason: HOSPADM

## 2022-01-11 RX ORDER — ACETAMINOPHEN 325 MG/1
975 TABLET ORAL ONCE
Status: COMPLETED | OUTPATIENT
Start: 2022-01-11 | End: 2022-01-11

## 2022-01-11 RX ORDER — HYDROXYZINE HYDROCHLORIDE 25 MG/1
25 TABLET, FILM COATED ORAL 2 TIMES DAILY PRN
Status: DISCONTINUED | OUTPATIENT
Start: 2022-01-11 | End: 2022-01-11

## 2022-01-11 RX ORDER — HYDRALAZINE HYDROCHLORIDE 20 MG/ML
10 INJECTION INTRAMUSCULAR; INTRAVENOUS EVERY 6 HOURS PRN
Status: DISCONTINUED | OUTPATIENT
Start: 2022-01-11 | End: 2022-01-13 | Stop reason: HOSPADM

## 2022-01-11 RX ORDER — FOLIC ACID 1 MG/1
1 TABLET ORAL DAILY
Status: DISCONTINUED | OUTPATIENT
Start: 2022-01-12 | End: 2022-01-13 | Stop reason: HOSPADM

## 2022-01-11 RX ORDER — DIAZEPAM 10 MG
10 TABLET ORAL EVERY 30 MIN PRN
Status: DISCONTINUED | OUTPATIENT
Start: 2022-01-11 | End: 2022-01-13 | Stop reason: HOSPADM

## 2022-01-11 RX ORDER — ACETAMINOPHEN 325 MG/1
650 TABLET ORAL EVERY 6 HOURS PRN
Status: DISCONTINUED | OUTPATIENT
Start: 2022-01-11 | End: 2022-01-13 | Stop reason: HOSPADM

## 2022-01-11 RX ORDER — ENALAPRIL MALEATE 20 MG/1
40 TABLET ORAL DAILY
Status: DISCONTINUED | OUTPATIENT
Start: 2022-01-11 | End: 2022-01-13 | Stop reason: HOSPADM

## 2022-01-11 RX ORDER — LIDOCAINE 40 MG/G
CREAM TOPICAL
Status: DISCONTINUED | OUTPATIENT
Start: 2022-01-11 | End: 2022-01-13 | Stop reason: HOSPADM

## 2022-01-11 RX ORDER — DIAZEPAM 5 MG
5-20 TABLET ORAL EVERY 30 MIN PRN
Status: DISCONTINUED | OUTPATIENT
Start: 2022-01-11 | End: 2022-01-11

## 2022-01-11 RX ORDER — HYDROCHLOROTHIAZIDE 12.5 MG/1
25 TABLET ORAL DAILY
Status: DISCONTINUED | OUTPATIENT
Start: 2022-01-11 | End: 2022-01-13 | Stop reason: HOSPADM

## 2022-01-11 RX ORDER — HALOPERIDOL 5 MG/ML
2.5-5 INJECTION INTRAMUSCULAR EVERY 6 HOURS PRN
Status: DISCONTINUED | OUTPATIENT
Start: 2022-01-11 | End: 2022-01-13 | Stop reason: HOSPADM

## 2022-01-11 RX ORDER — GABAPENTIN 300 MG/1
600 CAPSULE ORAL
Status: DISCONTINUED | OUTPATIENT
Start: 2022-01-11 | End: 2022-01-13 | Stop reason: HOSPADM

## 2022-01-11 RX ORDER — ONDANSETRON 2 MG/ML
4 INJECTION INTRAMUSCULAR; INTRAVENOUS EVERY 6 HOURS PRN
Status: DISCONTINUED | OUTPATIENT
Start: 2022-01-11 | End: 2022-01-13 | Stop reason: HOSPADM

## 2022-01-11 RX ORDER — AMOXICILLIN 250 MG
1 CAPSULE ORAL 2 TIMES DAILY PRN
Status: DISCONTINUED | OUTPATIENT
Start: 2022-01-11 | End: 2022-01-13 | Stop reason: HOSPADM

## 2022-01-11 RX ORDER — BACLOFEN 10 MG/1
10 TABLET ORAL 3 TIMES DAILY
Status: DISCONTINUED | OUTPATIENT
Start: 2022-01-11 | End: 2022-01-11

## 2022-01-11 RX ORDER — HYDROXYZINE HYDROCHLORIDE 25 MG/1
25 TABLET, FILM COATED ORAL 2 TIMES DAILY PRN
Status: DISCONTINUED | OUTPATIENT
Start: 2022-01-11 | End: 2022-01-13 | Stop reason: HOSPADM

## 2022-01-11 RX ORDER — AMLODIPINE BESYLATE 5 MG/1
5 TABLET ORAL DAILY
Status: DISCONTINUED | OUTPATIENT
Start: 2022-01-12 | End: 2022-01-13 | Stop reason: HOSPADM

## 2022-01-11 RX ORDER — ONDANSETRON 4 MG/1
4 TABLET, ORALLY DISINTEGRATING ORAL EVERY 6 HOURS PRN
Status: DISCONTINUED | OUTPATIENT
Start: 2022-01-11 | End: 2022-01-13 | Stop reason: HOSPADM

## 2022-01-11 RX ADMIN — DIAZEPAM 10 MG: 5 TABLET ORAL at 01:49

## 2022-01-11 RX ADMIN — DIAZEPAM 10 MG: 5 TABLET ORAL at 16:44

## 2022-01-11 RX ADMIN — DIAZEPAM 10 MG: 5 TABLET ORAL at 20:14

## 2022-01-11 RX ADMIN — DIAZEPAM 10 MG: 5 TABLET ORAL at 08:17

## 2022-01-11 RX ADMIN — DIAZEPAM 5 MG: 5 TABLET ORAL at 11:35

## 2022-01-11 RX ADMIN — ENALAPRIL MALEATE 40 MG: 20 TABLET ORAL at 09:10

## 2022-01-11 RX ADMIN — ACETAMINOPHEN 975 MG: 325 TABLET, FILM COATED ORAL at 10:12

## 2022-01-11 RX ADMIN — DIAZEPAM 10 MG: 5 TABLET ORAL at 13:59

## 2022-01-11 RX ADMIN — IBUPROFEN 200 MG: 200 TABLET, FILM COATED ORAL at 16:44

## 2022-01-11 RX ADMIN — SERTRALINE HYDROCHLORIDE 50 MG: 50 TABLET ORAL at 09:10

## 2022-01-11 RX ADMIN — DIAZEPAM 10 MG: 5 TABLET ORAL at 03:45

## 2022-01-11 RX ADMIN — HYDROCHLOROTHIAZIDE 25 MG: 25 TABLET ORAL at 09:10

## 2022-01-11 RX ADMIN — BACLOFEN 10 MG: 10 TABLET ORAL at 09:10

## 2022-01-11 RX ADMIN — DIAZEPAM 10 MG: 5 TABLET ORAL at 10:17

## 2022-01-11 RX ADMIN — DIAZEPAM 10 MG: 5 TABLET ORAL at 06:17

## 2022-01-11 ASSESSMENT — ACTIVITIES OF DAILY LIVING (ADL)
ADLS_ACUITY_SCORE: 12

## 2022-01-11 ASSESSMENT — MIFFLIN-ST. JEOR: SCORE: 1679.04

## 2022-01-11 NOTE — ED PROVIDER NOTES
ED Observation Discharge Summary  [facility]  Discharge Date: 1/11/2022    Daniela Moses MRN: [id number]   Age: 56 year old YOB: 1965     Brief HPI & Initial ED Course     Chief Complaint   Patient presents with     Alcohol Problem     HPI  Daniela Mosse is a 56 year old female with PMH notable for hypertension and alcohol dependence who presented to the ED with altered mental status. Initial history was limited due to altered mental status. The patient arrived with altered mental status with reason to suspect alcohol or other drug intoxication as etiology, and an exam that was without findings suggestive of trauma.     Upon being evaluated in the emergency department, the patient was found to have a condition that would benefit from ongoing monitoring. Observation care was initiated with plan for close clinical monitoring of the patient and her mental status for clearing of intoxicating substance, and broadening of the work-up if not clearing appropriately or if other indications develop.      Patient was incidentally COVID-positive without symptoms.  Oxygen saturations normal.  She initially did not want to be admitted to the medical unit for treatment of her alcohol withdrawal and had intended to call a different detox treatment facility in the morning.  However, in the morning she was unable to reach this facility.  She did require multiple doses of Valium overnight to treat her withdrawal symptoms.  She was agreeable to being admitted to medicine at this time since she is not a candidate for detox unit with her COVID-positive status.      See ED Observation H&P for further details on the patient's presenting history and initial evaluation.     Physical Exam   BP: (!) 148/92  Pulse: 84  Temp:  (unable to take due to pt not cooperative)  Resp: 16  SpO2: 100 %    Physical Exam  Vitals and nursing note reviewed.   Constitutional:       General: She is not in acute  distress.     Appearance: Normal appearance. She is well-developed. She is obese. She is not ill-appearing or diaphoretic.   HENT:      Head: Normocephalic and atraumatic.      Nose: Nose normal.      Mouth/Throat:      Mouth: Mucous membranes are moist.   Eyes:      General: No scleral icterus.     Conjunctiva/sclera: Conjunctivae normal.   Cardiovascular:      Rate and Rhythm: Normal rate.   Pulmonary:      Effort: Pulmonary effort is normal. No respiratory distress.      Breath sounds: No stridor.   Abdominal:      General: There is no distension.   Musculoskeletal:         General: No deformity or signs of injury. Normal range of motion.      Cervical back: Normal range of motion and neck supple. No rigidity.   Skin:     General: Skin is warm and dry.      Coloration: Skin is not jaundiced or pale.   Neurological:      General: No focal deficit present.      Mental Status: She is alert and oriented to person, place, and time.   Psychiatric:         Mood and Affect: Mood normal.         Behavior: Behavior normal.           Results      Procedures                  Labs Ordered and Resulted from Time of ED Arrival to Time of ED Departure   COVID-19 VIRUS (CORONAVIRUS) BY PCR - Abnormal       Result Value    SARS CoV2 PCR Positive (*)    ETHYL ALCOHOL LEVEL - Abnormal    Alcohol ethyl 0.41 (*)    CBC WITH PLATELETS AND DIFFERENTIAL - Abnormal    WBC Count 5.9      RBC Count 5.04      Hemoglobin 14.2      Hematocrit 42.4      MCV 84      MCH 28.2      MCHC 33.5      RDW 15.1 (*)     Platelet Count 237      % Neutrophils 51      % Lymphocytes 42      % Monocytes 5      % Eosinophils 1      % Basophils 0      % Immature Granulocytes 1      NRBCs per 100 WBC 0      Absolute Neutrophils 3.0      Absolute Lymphocytes 2.5      Absolute Monocytes 0.3      Absolute Eosinophils 0.1      Absolute Basophils 0.0      Absolute Immature Granulocytes 0.0      Absolute NRBCs 0.0     COMPREHENSIVE METABOLIC PANEL - Abnormal    Sodium  143      Potassium 3.7      Chloride 108      Carbon Dioxide (CO2) 24      Anion Gap 11      Urea Nitrogen 9      Creatinine 0.52      Calcium 8.4 (*)     Glucose 115 (*)     Alkaline Phosphatase 89      AST 70 (*)     ALT 51 (*)     Protein Total 7.3      Albumin 3.1 (*)     Bilirubin Total 0.3      GFR Estimate >90     ALCOHOL BREATH TEST POCT            Observation Course   The patient was admitted to observation status with plan for close clinical monitoring of the patient and her mental status for clearing of intoxicating substance, and broadening of the work-up if not clearing appropriately or if other indications develop.     Serial assessments of the patient's mental status were performed. Nursing notes were reviewed. During the observation period, the patient did not require medications for agitation, and did not require restraints/seclusion for patient and/or provider safety.         During the patient's time in observation care, the patient's situation changed such that inpatient admission is now indicated. Patient transitioned to inpatient status with [facility] on telemetry.     Discharge Diagnoses:   Final diagnoses:   Alcoholic intoxication without complication (H)   Infection due to 2019 novel coronavirus   Alcohol withdrawal syndrome without complication (H)       --  Sheeba Mayer MD  [facility] EMERGENCY DEPARTMENT  1/11/2022             Sheeba Mayer MD  01/11/22 4748

## 2022-01-11 NOTE — ED NOTES
Patient restless and complaining of left hand numbness. Patient told writer that she is will be going to a sober house for detox. Writer informed patient that due to her [place]vid situation, it maybe difficult for her to get into that sober house. MD informed.

## 2022-01-11 NOTE — ED NOTES
[facility] ED Mental Health Handoff Note:       Brief HPI:  This is a 56 year old female signed out to me by Dr. Alcala.  See initial ED Provider note for full details of the presentation. Interval history is pertinent for patient presenting with homelessness, alcohol withdrawal syndrome requesting detox, and incidentally COVID-positive without symptoms.  Patient was quite ambivalent and indecisive about disposition planning and did not want to be admitted to the medical unit to treat her alcohol withdrawal.  She cannot go to detox because of her COVID-positive status.  She did have some sort of plan to go to an alternative treatment facility at 9 AM today.  Dr. Alcala had discussed reevaluation at 9 AM to determine what she intends to do.  Patient was placed on ED observation overnight.    Home meds reviewed and ordered/administered: No    Medically stable for inpatient mental health admission: Yes.    Evaluated by mental health: Does not require a DEC Assessment for mental health concerns.    Safety concerns: At the time I received sign out, there were no safety concerns.    Hold Status:  Active Orders   N/A            Exam:   Patient Vitals for the past 24 hrs:   BP Temp Temp src Pulse Resp SpO2   01/11/22 0814 (!) 209/100 97.8  F (36.6  C) Oral 112 16 97 %   01/11/22 0605 (!) 188/96 -- -- 109 -- 95 %   01/11/22 0352 -- 98  F (36.7  C) Oral -- -- --   01/11/22 0351 (!) 154/78 -- -- 108 18 95 %   01/10/22 1746 (!) 148/92 -- -- 84 16 100 %           ED Course:    Medications   diazepam (VALIUM) tablet 5-20 mg (10 mg Oral Given 1/11/22 0617)            There were no significant events during my shift.    Patient was signed out to the oncoming provider, Dr. Hdez      Impression:    ICD-10-CM    1. Alcoholic intoxication without complication (H)  F10.920    2. Infection due to 2019 novel coronavirus  U07.1        Plan:    1. Awaiting chemical dependency admission/transfer.  To [facility]  Patient  has been requiring multiple doses of Valium to treat her alcohol withdrawal symptoms.  She was unable to reach whoever she was intending to go to this morning for discharge.  She is currently willing to be admitted to the medical unit for ongoing management of her withdrawal symptoms since she is COVID-positive and not a candidate for detox.  Admitted to [facility] on telemetry.  The obs discharge summary is included in a separate note.    RESULTS:   Results for orders placed or performed during the hospital encounter of 01/10/22 (from the past 24 hour(s))   CBC with platelets differential     Status: Abnormal    Collection Time: 01/10/22  6:19 PM    Narrative    The following orders were created for panel order CBC with platelets differential.  Procedure                               Abnormality         Status                     ---------                               -----------         ------                     CBC with platelets and d...[[id number]]  Abnormal            Final result                 Please view results for these tests on the individual orders.   CBC with platelets and differential     Status: Abnormal    Collection Time: 01/10/22  6:19 PM   Result Value Ref Range    WBC Count 5.9 4.0 - 11.0 10e3/uL    RBC Count 5.04 3.80 - 5.20 10e6/uL    Hemoglobin 14.2 11.7 - 15.7 g/dL    Hematocrit 42.4 35.0 - 47.0 %    MCV 84 78 - 100 fL    MCH 28.2 26.5 - 33.0 pg    MCHC 33.5 31.5 - 36.5 g/dL    RDW 15.1 (H) 10.0 - 15.0 %    Platelet Count 237 150 - 450 10e3/uL    % Neutrophils 51 %    % Lymphocytes 42 %    % Monocytes 5 %    % Eosinophils 1 %    % Basophils 0 %    % Immature Granulocytes 1 %    NRBCs per 100 WBC 0 <1 /100    Absolute Neutrophils 3.0 1.6 - 8.3 10e3/uL    Absolute Lymphocytes 2.5 0.8 - 5.3 10e3/uL    Absolute Monocytes 0.3 0.0 - 1.3 10e3/uL    Absolute Eosinophils 0.1 0.0 - 0.7 10e3/uL    Absolute Basophils 0.0 0.0 - 0.2 10e3/uL    Absolute Immature Granulocytes 0.0 <=0.4 10e3/uL     Absolute NRBCs 0.0 10e3/uL   Asymptomatic COVID-19 Virus (Coronavirus) by PCR Nasopharyngeal     Status: Abnormal    Collection Time: 01/10/22  6:20 PM    Specimen: Nasopharyngeal; Swab   Result Value Ref Range    SARS CoV2 PCR Positive (A) Negative    Narrative    Testing was performed using the johnie  SARS-CoV-2 & Influenza A/B Assay on the johnie  Leandra  System.  This test should be ordered for the detection of SARS-COV-2 in individuals who meet SARS-CoV-2 clinical and/or epidemiological criteria. Test performance is unknown in asymptomatic patients.  This test is for in vitro diagnostic use under the FDA EUA for laboratories certified under CLIA to perform moderate and/or high complexity testing. This test has not been FDA cleared or approved.  A negative test does not rule out the presence of PCR inhibitors in the specimen or target RNA in concentration below the limit of detection for the assay. The possibility of a false negative should be considered if the patient's recent exposure or clinical presentation suggests COVID-19.  [facility] are certified under the Clinical Laboratory Improvement Amendments of 1988 (CLIA-88) as qualified to perform moderate and/or high complexity laboratory testing.   Alcohol level blood     Status: Abnormal    Collection Time: 01/10/22  8:28 PM   Result Value Ref Range    Alcohol ethyl 0.41 (HH) <=0.01 g/dL   Comprehensive metabolic panel     Status: Abnormal    Collection Time: 01/10/22  8:28 PM   Result Value Ref Range    Sodium 143 133 - 144 mmol/L    Potassium 3.7 3.4 - 5.3 mmol/L    Chloride 108 94 - 109 mmol/L    Carbon Dioxide (CO2) 24 20 - 32 mmol/L    Anion Gap 11 3 - 14 mmol/L    Urea Nitrogen 9 7 - 30 mg/dL    Creatinine 0.52 0.52 - 1.04 mg/dL    Calcium 8.4 (L) 8.5 - 10.1 mg/dL    Glucose 115 (H) 70 - 99 mg/dL    Alkaline Phosphatase 89 40 - 150 U/L    AST 70 (H) 0 - 45 U/L    ALT 51 (H) 0 - 50 U/L    Protein Total 7.3 6.8 - 8.8 g/dL    Albumin 3.1  (L) 3.4 - 5.0 g/dL    Bilirubin Total 0.3 0.2 - 1.3 mg/dL    GFR Estimate >90 >60 mL/min/1.73m2             MD Moreno Okeefe Jill C, MD  01/11/22 1257
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
## 2022-01-11 NOTE — ED PROVIDER NOTES
ED Provider Note  [facility]      History     Chief Complaint   Patient presents with     Alcohol Problem     HPI  Daniela Moses is a 56 year old female with history of hypertension, alcohol dependence presents to the ED seeking detox from alcohol patient is dropped off by her family.  No additional history available at this time.  On arrival, patient is intoxicated, unable to provide any additional history.    Past Medical History  Past Medical History:   Diagnosis Date     Alcohol dependence (H)      HTN (hypertension)      History reviewed. No pertinent surgical history.  baclofen (LIORESAL) 10 MG tablet  capsaicin (ZOSTRIX) 0.025 % external cream  enalapril (VASOTEC) 20 MG tablet  folic acid (FOLVITE) 1 MG tablet  gabapentin (NEURONTIN) 300 MG capsule  hydrochlorothiazide (HYDRODIURIL) 25 MG tablet  hydrOXYzine (ATARAX) 25 MG tablet  multivitamin w/minerals (THERA-VIT-M) tablet  sertraline (ZOLOFT) 50 MG tablet  thiamine (B-1) 100 MG tablet  traZODone (DESYREL) 50 MG tablet      Allergies   Allergen Reactions     Hydrocodone Itching     Family History  No family history on file.  Social History   Social History     Tobacco Use     Smoking status: Never Smoker     Smokeless tobacco: Never Used   Substance Use Topics     Alcohol use: Yes     Drug use: None      Past medical history, past surgical history, medications, allergies, family history, and social history were reviewed with the patient. No additional pertinent items.       Review of Systems   Unable to perform ROS: Mental status change     A complete review of systems was performed with pertinent positives and negatives noted in the HPI, and all other systems negative.    Physical Exam   BP: (!) 148/92  Pulse: 84  Temp:  (unable to take due to pt not cooperative)  Resp: 16  SpO2: 100 %  Physical Exam  Vitals and nursing note reviewed.   Constitutional:       General: She is not in acute distress.     Appearance: Normal  appearance.      Comments: Slurred/unintelligible speech.  Unstable gait.  EtOH smell.  Clinically intoxicated.   HENT:      Head: Normocephalic and atraumatic.      Nose: Nose normal.   Eyes:      Pupils: Pupils are equal, round, and reactive to light.   Cardiovascular:      Rate and Rhythm: Normal rate and regular rhythm.   Pulmonary:      Effort: Pulmonary effort is normal.   Abdominal:      General: There is no distension.      Palpations: Abdomen is soft.   Musculoskeletal:         General: No deformity. Normal range of motion.      Cervical back: Normal range of motion.   Skin:     General: Skin is warm.   Neurological:      Mental Status: She is alert.      Comments: Moving all extremities.  No obvious focal deficits.   Psychiatric:         Speech: Speech is slurred.         ED Course      Procedures       The medical record was reviewed and interpreted.  Current labs reviewed and interpreted.     -----  Observation Addendum  With this Addendum, this ED Provider Note may also serve as an Observation H&P    Observation Initiation Date: Saurabh 10, 2022    Patient arriving with altered mental status with reason to suspect alcohol or other drug intoxication as etiology. Nursing notes reviewed. Exam without findings suggestive of trauma, non-focal. Breath EtOH 0.41. Glucose wnl.     AMS is likely due to intoxication delirium but cannot immediately rule out other dangerous etiologies of AMS. Plan close clinical monitoring of the patient and her mental status for clearing of intoxicating substance. With approriate clearing, the patient would likely be able to be discharged. If not appropriately clearing, plan broadening of work-up, potentially including CT head and serum labs.     During the initial care period, the patient did not require medications for agitation, and did not require restraints/seclusion for patient and/or provider safety.     The patient's outpatient medications were not reconciled due to altered  mental status.   -----              Results for orders placed or performed during the hospital encounter of 01/10/22   Asymptomatic COVID-19 Virus (Coronavirus) by PCR Nasopharyngeal     Status: Abnormal    Specimen: Nasopharyngeal; Swab   Result Value Ref Range    SARS CoV2 PCR Positive (A) Negative    Narrative    Testing was performed using the jonhie  SARS-CoV-2 & Influenza A/B Assay on the johnie  Leandra  System.  This test should be ordered for the detection of SARS-COV-2 in individuals who meet SARS-CoV-2 clinical and/or epidemiological criteria. Test performance is unknown in asymptomatic patients.  This test is for in vitro diagnostic use under the FDA EUA for laboratories certified under CLIA to perform moderate and/or high complexity testing. This test has not been FDA cleared or approved.  A negative test does not rule out the presence of PCR inhibitors in the specimen or target RNA in concentration below the limit of detection for the assay. The possibility of a false negative should be considered if the patient's recent exposure or clinical presentation suggests COVID-19.  [facility] are certified under the Clinical Laboratory Improvement Amendments of 1988 (CLIA-88) as qualified to perform moderate and/or high complexity laboratory testing.   Alcohol level blood     Status: Abnormal   Result Value Ref Range    Alcohol ethyl 0.41 (HH) <=0.01 g/dL   CBC with platelets and differential     Status: Abnormal   Result Value Ref Range    WBC Count 5.9 4.0 - 11.0 10e3/uL    RBC Count 5.04 3.80 - 5.20 10e6/uL    Hemoglobin 14.2 11.7 - 15.7 g/dL    Hematocrit 42.4 35.0 - 47.0 %    MCV 84 78 - 100 fL    MCH 28.2 26.5 - 33.0 pg    MCHC 33.5 31.5 - 36.5 g/dL    RDW 15.1 (H) 10.0 - 15.0 %    Platelet Count 237 150 - 450 10e3/uL    % Neutrophils 51 %    % Lymphocytes 42 %    % Monocytes 5 %    % Eosinophils 1 %    % Basophils 0 %    % Immature Granulocytes 1 %    NRBCs per 100 WBC 0 <1 /100     Absolute Neutrophils 3.0 1.6 - 8.3 10e3/uL    Absolute Lymphocytes 2.5 0.8 - 5.3 10e3/uL    Absolute Monocytes 0.3 0.0 - 1.3 10e3/uL    Absolute Eosinophils 0.1 0.0 - 0.7 10e3/uL    Absolute Basophils 0.0 0.0 - 0.2 10e3/uL    Absolute Immature Granulocytes 0.0 <=0.4 10e3/uL    Absolute NRBCs 0.0 10e3/uL   Comprehensive metabolic panel     Status: Abnormal   Result Value Ref Range    Sodium 143 133 - 144 mmol/L    Potassium 3.7 3.4 - 5.3 mmol/L    Chloride 108 94 - 109 mmol/L    Carbon Dioxide (CO2) 24 20 - 32 mmol/L    Anion Gap 11 3 - 14 mmol/L    Urea Nitrogen 9 7 - 30 mg/dL    Creatinine 0.52 0.52 - 1.04 mg/dL    Calcium 8.4 (L) 8.5 - 10.1 mg/dL    Glucose 115 (H) 70 - 99 mg/dL    Alkaline Phosphatase 89 40 - 150 U/L    AST 70 (H) 0 - 45 U/L    ALT 51 (H) 0 - 50 U/L    Protein Total 7.3 6.8 - 8.8 g/dL    Albumin 3.1 (L) 3.4 - 5.0 g/dL    Bilirubin Total 0.3 0.2 - 1.3 mg/dL    GFR Estimate >90 >60 mL/min/1.73m2   CBC with platelets differential     Status: Abnormal    Narrative    The following orders were created for panel order CBC with platelets differential.  Procedure                               Abnormality         Status                     ---------                               -----------         ------                     CBC with platelets and d...[[id number]]  Abnormal            Final result                 Please view results for these tests on the individual orders.     Medications - No data to display     Assessments & Plan (with Medical Decision Making)   Patient presents to the ED seeking detox from alcohol.  On arrival, she has unstable gait, slurred speech, EtOH smell, consistent with clinical intoxication.  She is unable to provide any additional history.  Plan for basic labs including CBC, CMP, COVID-19, point-of-care alcohol breath test.  Patient has been placed on the list for alcohol detox.    Patient blood alcohol is 0.41.  She is unsure if she is interested in detox.  Patient will need  to be monitored in the ED overnight with plan to reassess when mental status improves.    Incidentally, COVID is positive.  Patient without any adventitious lung sounds, respiratory distress, or other negative sequela.  No further work-up indicated.  If discharged, can enroll in get well loop.    I have reviewed the nursing notes. I have reviewed the findings, diagnosis, plan and need for follow up with the patient.    New Prescriptions    No medications on file       Final diagnoses:   Alcoholic intoxication without complication (H)   Infection due to 2019 novel coronavirus       --  Jaciel Woodard DO  [facility] EMERGENCY DEPARTMENT  1/10/2022     Jaciel Woodard DO  01/10/22 6813

## 2022-01-12 LAB
ALBUMIN SERPL-MCNC: 2.6 G/DL (ref 3.4–5)
ALP SERPL-CCNC: 99 U/L (ref 40–150)
ALT SERPL W P-5'-P-CCNC: 35 U/L (ref 0–50)
ANION GAP SERPL CALCULATED.3IONS-SCNC: 7 MMOL/L (ref 3–14)
AST SERPL W P-5'-P-CCNC: 30 U/L (ref 0–45)
ATRIAL RATE - MUSE: 90 BPM
BILIRUB SERPL-MCNC: 1.3 MG/DL (ref 0.2–1.3)
BUN SERPL-MCNC: 12 MG/DL (ref 7–30)
CALCIUM SERPL-MCNC: 8.8 MG/DL (ref 8.5–10.1)
CHLORIDE BLD-SCNC: 99 MMOL/L (ref 94–109)
CO2 SERPL-SCNC: 28 MMOL/L (ref 20–32)
CREAT SERPL-MCNC: 0.66 MG/DL (ref 0.52–1.04)
DIASTOLIC BLOOD PRESSURE - MUSE: NORMAL MMHG
ERYTHROCYTE [DISTWIDTH] IN BLOOD BY AUTOMATED COUNT: 14.7 % (ref 10–15)
GFR SERPL CREATININE-BSD FRML MDRD: >90 ML/MIN/1.73M2
GLUCOSE BLD-MCNC: 107 MG/DL (ref 70–99)
HCT VFR BLD AUTO: 35.9 % (ref 35–47)
HGB BLD-MCNC: 11.8 G/DL (ref 11.7–15.7)
INTERPRETATION ECG - MUSE: NORMAL
MCH RBC QN AUTO: 28 PG (ref 26.5–33)
MCHC RBC AUTO-ENTMCNC: 32.9 G/DL (ref 31.5–36.5)
MCV RBC AUTO: 85 FL (ref 78–100)
P AXIS - MUSE: 55 DEGREES
PLATELET # BLD AUTO: 159 10E3/UL (ref 150–450)
POTASSIUM BLD-SCNC: 3.3 MMOL/L (ref 3.4–5.3)
POTASSIUM BLD-SCNC: 3.6 MMOL/L (ref 3.4–5.3)
PR INTERVAL - MUSE: 148 MS
PROT SERPL-MCNC: 6.1 G/DL (ref 6.8–8.8)
QRS DURATION - MUSE: 104 MS
QT - MUSE: 376 MS
QTC - MUSE: 459 MS
R AXIS - MUSE: -68 DEGREES
RBC # BLD AUTO: 4.22 10E6/UL (ref 3.8–5.2)
SODIUM SERPL-SCNC: 134 MMOL/L (ref 133–144)
SYSTOLIC BLOOD PRESSURE - MUSE: NORMAL MMHG
T AXIS - MUSE: 60 DEGREES
VENTRICULAR RATE- MUSE: 90 BPM
WBC # BLD AUTO: 3.3 10E3/UL (ref 4–11)

## 2022-01-12 PROCEDURE — 250N000013 HC RX MED GY IP 250 OP 250 PS 637: Performed by: PHYSICIAN ASSISTANT

## 2022-01-12 PROCEDURE — 250N000013 HC RX MED GY IP 250 OP 250 PS 637: Performed by: INTERNAL MEDICINE

## 2022-01-12 PROCEDURE — 85027 COMPLETE CBC AUTOMATED: CPT | Performed by: PHYSICIAN ASSISTANT

## 2022-01-12 PROCEDURE — 82040 ASSAY OF SERUM ALBUMIN: CPT | Performed by: PHYSICIAN ASSISTANT

## 2022-01-12 PROCEDURE — 84132 ASSAY OF SERUM POTASSIUM: CPT | Performed by: INTERNAL MEDICINE

## 2022-01-12 PROCEDURE — 36415 COLL VENOUS BLD VENIPUNCTURE: CPT | Performed by: PHYSICIAN ASSISTANT

## 2022-01-12 PROCEDURE — 250N000011 HC RX IP 250 OP 636: Performed by: INTERNAL MEDICINE

## 2022-01-12 PROCEDURE — 999N000216 HC STATISTIC ADULT CD FACE TO FACE-NO CHRG

## 2022-01-12 PROCEDURE — 99232 SBSQ HOSP IP/OBS MODERATE 35: CPT | Performed by: INTERNAL MEDICINE

## 2022-01-12 PROCEDURE — 250N000013 HC RX MED GY IP 250 OP 250 PS 637: Performed by: EMERGENCY MEDICINE

## 2022-01-12 PROCEDURE — 80053 COMPREHEN METABOLIC PANEL: CPT | Performed by: PHYSICIAN ASSISTANT

## 2022-01-12 PROCEDURE — 258N000003 HC RX IP 258 OP 636: Performed by: PHYSICIAN ASSISTANT

## 2022-01-12 PROCEDURE — 250N000011 HC RX IP 250 OP 636: Performed by: PHYSICIAN ASSISTANT

## 2022-01-12 PROCEDURE — 36415 COLL VENOUS BLD VENIPUNCTURE: CPT | Performed by: INTERNAL MEDICINE

## 2022-01-12 PROCEDURE — 120N000002 HC R&B MED SURG/OB UMMC

## 2022-01-12 RX ORDER — POTASSIUM CHLORIDE 750 MG/1
40 TABLET, EXTENDED RELEASE ORAL ONCE
Status: COMPLETED | OUTPATIENT
Start: 2022-01-12 | End: 2022-01-12

## 2022-01-12 RX ORDER — CELECOXIB 100 MG/1
100 CAPSULE ORAL 2 TIMES DAILY PRN
Status: ON HOLD | COMMUNITY
End: 2022-01-13

## 2022-01-12 RX ORDER — ACETAMINOPHEN 500 MG
1000 TABLET ORAL EVERY 8 HOURS PRN
Status: ON HOLD | COMMUNITY
End: 2022-01-13

## 2022-01-12 RX ADMIN — AMLODIPINE BESYLATE 5 MG: 5 TABLET ORAL at 08:55

## 2022-01-12 RX ADMIN — IBUPROFEN 200 MG: 200 TABLET, FILM COATED ORAL at 16:15

## 2022-01-12 RX ADMIN — ENOXAPARIN SODIUM 40 MG: 40 INJECTION SUBCUTANEOUS at 01:12

## 2022-01-12 RX ADMIN — BACLOFEN 10 MG: 10 TABLET ORAL at 19:34

## 2022-01-12 RX ADMIN — BACLOFEN 10 MG: 10 TABLET ORAL at 00:10

## 2022-01-12 RX ADMIN — THIAMINE HCL TAB 100 MG 100 MG: 100 TAB at 08:59

## 2022-01-12 RX ADMIN — IBUPROFEN 200 MG: 200 TABLET, FILM COATED ORAL at 00:10

## 2022-01-12 RX ADMIN — HYDROCHLOROTHIAZIDE 25 MG: 25 TABLET ORAL at 08:57

## 2022-01-12 RX ADMIN — BACLOFEN 10 MG: 10 TABLET ORAL at 08:58

## 2022-01-12 RX ADMIN — SODIUM CHLORIDE, POTASSIUM CHLORIDE, SODIUM LACTATE AND CALCIUM CHLORIDE: 600; 310; 30; 20 INJECTION, SOLUTION INTRAVENOUS at 00:10

## 2022-01-12 RX ADMIN — POTASSIUM CHLORIDE 40 MEQ: 750 TABLET, EXTENDED RELEASE ORAL at 14:06

## 2022-01-12 RX ADMIN — ENALAPRIL MALEATE 40 MG: 20 TABLET ORAL at 08:57

## 2022-01-12 RX ADMIN — MULTIPLE VITAMINS W/ MINERALS TAB 1 TABLET: TAB at 08:58

## 2022-01-12 RX ADMIN — ACETAMINOPHEN 650 MG: 325 TABLET, FILM COATED ORAL at 19:34

## 2022-01-12 RX ADMIN — IBUPROFEN 200 MG: 200 TABLET, FILM COATED ORAL at 09:12

## 2022-01-12 RX ADMIN — SODIUM CHLORIDE, POTASSIUM CHLORIDE, SODIUM LACTATE AND CALCIUM CHLORIDE: 600; 310; 30; 20 INJECTION, SOLUTION INTRAVENOUS at 10:25

## 2022-01-12 RX ADMIN — ENOXAPARIN SODIUM 40 MG: 40 INJECTION SUBCUTANEOUS at 19:34

## 2022-01-12 RX ADMIN — IBUPROFEN 200 MG: 200 TABLET, FILM COATED ORAL at 22:32

## 2022-01-12 RX ADMIN — SERTRALINE HYDROCHLORIDE 50 MG: 50 TABLET ORAL at 08:57

## 2022-01-12 RX ADMIN — HYDROXYZINE HYDROCHLORIDE 25 MG: 25 TABLET, FILM COATED ORAL at 18:22

## 2022-01-12 RX ADMIN — DIAZEPAM 10 MG: 10 TABLET ORAL at 10:36

## 2022-01-12 RX ADMIN — DIAZEPAM 10 MG: 10 TABLET ORAL at 00:10

## 2022-01-12 RX ADMIN — FOLIC ACID 1 MG: 1 TABLET ORAL at 08:57

## 2022-01-12 ASSESSMENT — ACTIVITIES OF DAILY LIVING (ADL)
ADLS_ACUITY_SCORE: 4
ADLS_ACUITY_SCORE: 8
ADLS_ACUITY_SCORE: 12
ADLS_ACUITY_SCORE: 8
ADLS_ACUITY_SCORE: 8
ADLS_ACUITY_SCORE: 4
ADLS_ACUITY_SCORE: 8
ADLS_ACUITY_SCORE: 4
ADLS_ACUITY_SCORE: 8
ADLS_ACUITY_SCORE: 4
ADLS_ACUITY_SCORE: 8
ADLS_ACUITY_SCORE: 4
ADLS_ACUITY_SCORE: 8
ADLS_ACUITY_SCORE: 4
ADLS_ACUITY_SCORE: 8
ADLS_ACUITY_SCORE: 8
ADLS_ACUITY_SCORE: 12
ADLS_ACUITY_SCORE: 4
ADLS_ACUITY_SCORE: 8
ADLS_ACUITY_SCORE: 8

## 2022-01-12 NOTE — PLAN OF CARE
"BP: 183/81, Pulse 85, temp: 98.2, RR: 18, O2 100% RA    Patient expressed sadness to writer in the AM. Stated she is sad about being homeless and not having someone to talk too. Writer asked patient if she is thinking about hurting herself and patient said \"no\". Patient is now on RN Managed Potassium replacement and received a dose from writer this afternoon. Patient denies SOB and chest pain. Patient is COIVD + but asymptomatic. No significant changes during shift.  "

## 2022-01-12 NOTE — CONSULTS
Care Management Initial Consult    General Information  Assessment completed with: Patient,    Type of CM/SW Visit: Initial Assessment    Primary Care Provider verified and updated as needed: Yes (Erika Tang)   Readmission within the last 30 days: unable to assess      Reason for Consult: discharge planning  Advance Care Planning: pt was not interested having information on HCD       Communication Assessment  Patient's communication style: spoken language (English or Bilingual)    Hearing Difficulty or Deaf: no   Wear Glasses or Blind: no    Cognitive  Cognitive/Neuro/Behavioral:                        Living Environment:   People in home: alone     Current living Arrangements: homeless      Able to return to prior arrangements:         Family/Social Support:  Care provided by: self  Provides care for: no one  Marital Status: Single             Description of Support System:  Pt stated that she has alienated many people she her support system is very limited. She has a friend in [place]. Family is not supporting her at this time.         Current Resources:   Patient receiving home care services:       Community Resources:    Equipment currently used at home: walker, standard  Supplies currently used at home:      Employment/Financial:  Employment Status: employed full-time        Financial Concerns:  (Pt has not received her [email or web address] Pt is looking into short term disability from employer          Lifestyle & Psychosocial Needs:  Social Determinants of Health     Tobacco Use: Low Risk      Smoking Tobacco Use: Never Smoker     Smokeless Tobacco Use: Never Used   Alcohol Use: Not on file   Financial Resource Strain: Not on file   Food Insecurity: Not on file   Transportation Needs: Not on file   Physical Activity: Not on file   Stress: Not on file   Social Connections: Not on file   Intimate Partner Violence: Not on file   Depression: Not on file   Housing Stability: Not on file       Functional  Status:  Prior to admission patient needed assistance:              Mental Health Status:  Mental Health Status: Current Concern  Mental Health Management: Medication    Chemical Dependency Status:  Chemical Dependency Status: Current Concern  Chemical Dependency Management: Previous treatment          Values/Beliefs:  Spiritual, Cultural Beliefs, Hindu Practices, Values that affect care:                 Additional Information:  DEVAUGHN met with pt via phone. SW introduced herself and explained her role. Pt is currently homeless. Pt was in CD treatment and they went to sober housing. Pt stated that she did not want to stay at the sober housing. She stated that she has been staying with friends since she left sober housing. Pt is employed full time but she stated she has not received her paycheck and she is working on applying for short term disability through her employer.  She describes her support network as limited as she has alienated family and she has a few friends. Pt had CD consult today and pt is not interested in attending inpatient CD tx at this time. CD  emailed DEVAUGHN CD resources for pt. DEVAUGHN let pt know that she will give her bedside nurse the resources. Pt stated that she has been diagnosed with anxiety and depression. She currently takes medications. Pt would like to get set up with a psychiatrist and therapist in the community. Pt's PCP is E[facility] L[facility] at [facility]. DEVAUGHN will continue to follow pt throughout hospitalization and assist with discharge planning as needed.     Micheline Lopez [address]   Ph [phone]  Pager [phone]

## 2022-01-12 NOTE — PLAN OF CARE
Pt arrived on the unit from the ED in a wheelchair. Pt was able to ambulate from wheel chair to bed - SBA. Vitals stable. C/o right hip pain rating it at 7 or 8. Ibuprofen given with some relief. CIWA score at 8 and got a dose of Valium. Ambulating using walker in room. Voiding without difficulty. Able to make her needs known. Will continue with plan of care.

## 2022-01-12 NOTE — PHARMACY-ADMISSION MEDICATION HISTORY
Admission Medication History Completed by Pharmacy    See [facility] Admission Navigator for allergy information, preferred outpatient pharmacy, prior to admission medications and immunization status.     Medication History Sources:     Patient    Pharmacy fill history via [facility] Everywhere    Changes made to PTA medication list (reason):    Added: celecoxib, acetaminophen (per fill history, pt)    Deleted: None    Changed:   o Baclofen 10 mg tid --> 15 mg tid (per fill history, pt)    Additional Information:    Pt hasn't taken medications in a few weeks    She has a prescription for zolpidem 10 mg hs prn sleep but is out of it. Per fill history it hasn't been filled since June 2021 so left off the list.    Prior to Admission medications    Medication Sig Last Dose Taking? Auth Provider   baclofen (LIORESAL) 10 MG tablet Take 1 tablet (10 mg) by mouth 3 times daily  Patient taking differently: Take 15 mg by mouth 3 times daily  Past Month at Unknown time Yes Inna Medellin MD   capsaicin (ZOSTRIX) 0.025 % external cream Apply 1 g topically 2 times daily as needed (irritation) Past Month at Unknown time Yes Inna Medellin MD   celecoxib (CELEBREX) 100 MG capsule Take 100 mg by mouth 2 times daily as needed for moderate pain Past Month at Unknown time Yes Unknown, Entered By History   enalapril (VASOTEC) 20 MG tablet Take 2 tablets (40 mg) by mouth daily Past Month at Unknown time Yes Inna Medellin MD   folic acid (FOLVITE) 1 MG tablet Take 1 tablet (1 mg) by mouth daily Past Month at Unknown time Yes Inna Medellin MD   gabapentin (NEURONTIN) 300 MG capsule Take 2 capsules (600 mg) by mouth At Bedtime Past Month at Unknown time Yes Inna Medellin MD   hydrochlorothiazide (HYDRODIURIL) 25 MG tablet Take 1 tablet (25 mg) by mouth daily Past Month at Unknown time Yes Inna Medellin MD   hydrOXYzine (ATARAX) 25 MG tablet Take 1 tablet (25 mg) by mouth 2 times  daily as needed for anxiety Past Month at Unknown time Yes Inna Medellin MD   multivitamin w/minerals (THERA-VIT-M) tablet Take 1 tablet by mouth daily Past Month at Unknown time Yes Inna Medellin MD   sertraline (ZOLOFT) 50 MG tablet Take 1 tablet (50 mg) by mouth daily Past Month at Unknown time Yes Inna Medellin MD   thiamine (B-1) 100 MG tablet Take 1 tablet (100 mg) by mouth daily Past Month at Unknown time Yes Inna Medellin MD   traZODone (DESYREL) 50 MG tablet Take 1 tablet (50 mg) by mouth nightly as needed for sleep Past Month at Unknown time Yes Inna Medellin MD       Date completed: 01/12/22    Medication history completed by: Adrienne Barba, [facility]

## 2022-01-12 NOTE — PROGRESS NOTES
"[facility] [place]   Internal Medicine Daily Note           Interval History/Events       Patient tearful this AM  Denies any nausea, vomiting, chest pain, shortness of breath         Review of Systems        4 point ROS including Respiratory, CV, GI and , other than that noted above is negative      Medications   I have reviewed current medications  in the \"current medication\" section of Epic.  Relevant changes include:     Physical Exam   General:       Vital signs:    Blood pressure (!) 183/81, pulse 85, temperature 98.2  F (36.8  C), temperature source Oral, resp. rate 18, height 1.626 m (5' 4\"), weight 110.9 kg (244 lb 8 oz), SpO2 100 %.  Estimated body mass index is 41.97 kg/m  as calculated from the following:    Height as of this encounter: 1.626 m (5' 4\").    Weight as of this encounter: 110.9 kg (244 lb 8 oz).    No intake or output data in the 24 hours ending 01/12/22 1254   General: Alert, awake, and oriented  HEENT:NO icterus, no pallor  CVS/Chest:S1, S2 normal.   Respiratory/Chest:B/L CTA  GI/Abdomen/: Soft, NT, BS+  Extremities:  Others:     Laboratory and Imaging Studies     I have reviewed  laboratory and imaging studies in the Epic. Pertinent findings are as below:    BMP  Recent Labs   Lab 01/12/22  0703 01/11/22  1700 01/10/22  2028    133 143   POTASSIUM 3.3* 3.7 3.7   CHLORIDE 99 96 108   LUZ MARIA 8.8 9.0 8.4*   CO2 28 26 24   BUN 12 15 9   CR 0.66 0.68 0.52   * 115* 115*     CBC  Recent Labs   Lab 01/12/22  0703 01/10/22  1819   WBC 3.3* 5.9   RBC 4.22 5.04   HGB 11.8 14.2   HCT 35.9 42.4   MCV 85 84   MCH 28.0 28.2   MCHC 32.9 33.5   RDW 14.7 15.1*    237     INRNo lab results found in last 7 days.  LFTs  Recent Labs   Lab 01/12/22  0703 01/11/22  1700 01/10/22  2028   ALKPHOS 99 98 89   AST 30 56* 70*   ALT 35 52* 51*   BILITOTAL 1.3 0.9 0.3   PROTTOTAL 6.1* 7.5 7.3   ALBUMIN 2.6* 3.2* 3.1*      PANCNo lab results found in last 7 " days.        Impression/Plan        Daniela Moses is a 56 year old female with history of alcohol dependency, HTN, depression and anxiety who was admitted to [facility] ED observation 1/10 for alcohol detoxification in the setting of asymptomatic COVID 19 infection. Patient transferred to medicine 1/11 for ongoing care     # Asymptomatic COVID 19 infection (+ve on 01/10): Not vaccinated  - No indication for COVID specific treatment  - Monitor closely.   - COVID labs, workup with clinical changes      # Alcohol dependency with acute withdrawal, anxiety, depression, insomnia: Drinking 3 bottles of wine plus liquor PTA. Etoh 0.41 on presentation. Reports worsening depression but denies SI. PTA on Sertraline, Trazodone, and hydroxyzine   Requiring frequent Valium  - Psychiatry and SW consults  - CD consult   - CIWA with valium  - Discontinue IV fluids.   - Continue PTA meds     HTN: PTA on enalapril, amlodipine, and hydrochlorothiazide. BP 140s-200s/80s-100s in the setting of eoth withdrawal  BP high this AM, Started on Amlodipine 5 mg on 01/12 AM.   - Continue Amlodipine, Enalapril, and hydrochlorothiazide  - Hopefully BP will better controlled with alcohol withdrawal treatment as well.   - Hydralazine prn     Hypokalemia: Replace per protocols.      Tachycardia: HR 80s-110s in the setting of withdrawal. EKG NSR. Asymptomatic     Transaminitis: Mild. AST 70, ALT 51, ALP 89, Tbili 0.3 in the setting of etoh withdrawal  Resolved.      Chronic pain: Continue PTA gabapentin, baclofen     Diet:   Regular diet   DVT Prophylaxis: Enoxaparin (Lovenox) SQ  Aguilar Catheter: Not present  Central Lines: None  Code Status:   Full Code       Pt's care was discussed with bedside RN, patient and  during Care Team Rounds.               Sorin Maravilla MD  Hospitalist ( Internal medicine)  Pager: [phone]

## 2022-01-12 NOTE — CONSULTS
1/12/2022    Met with pt via telephone for CD consult. Pt reports she would not be interested in attending inpatient CD treatment at this time. Pt reports she has been to CD treatment 3 times in the last year. Pt reports she would like to attend therapy and sober support groups. Pt reports she would like to find sober living. Email has been sent to unit  with CD resources for pt.     [facility]  Agueda Menjivar    [facility] - Women's Program  Direct #: [phone]  [email or web address]    [place] Transitional Housing, Sober Housing  https://www.[facility].org/[place]-[facility] [employer]  Https://[facility].org    Supportive Services   Home  [employer]  https://www.[employer]    [facility]  https://www.smartrecDahuy.org    [place] [facility]   [address] [place] [address] [address]   [place] [address]   Phone: [phone]  http://[employer]    Alcoholics Anonymous  http://www.aa.org  Community Drug & Alcohol Support Resources   Alcoholics Anonymous   24/7 Phone Line: 305.722.4637   https://[place].org/   https://[place].org/   For additional list of online meetings: http://aa-intergroup.org     BERTHA Bello  Phone: [phone]  Email: [email or web address]
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
## 2022-01-12 NOTE — PLAN OF CARE
Patient is A&Ox4, able to make needs known, using call light appropriately.  VSS, LS clear. CMS intact, Neuros are intact. Pain well controlled, taking ibuprofen Q6H for right hip pain. C/O anxiety, given atarax X1. BS present , Patient is passing gas, LBM 01/12, Voiding spontaneously in the toilet. Tolerating regular diet. Denies dizziness, SOB & CP. Continue POC.

## 2022-01-13 VITALS
RESPIRATION RATE: 16 BRPM | HEIGHT: 64 IN | TEMPERATURE: 95.7 F | BODY MASS INDEX: 41.74 KG/M2 | SYSTOLIC BLOOD PRESSURE: 168 MMHG | OXYGEN SATURATION: 99 % | HEART RATE: 92 BPM | WEIGHT: 244.5 LBS | DIASTOLIC BLOOD PRESSURE: 87 MMHG

## 2022-01-13 PROCEDURE — 250N000013 HC RX MED GY IP 250 OP 250 PS 637: Performed by: EMERGENCY MEDICINE

## 2022-01-13 PROCEDURE — 99239 HOSP IP/OBS DSCHRG MGMT >30: CPT | Performed by: INTERNAL MEDICINE

## 2022-01-13 PROCEDURE — 250N000013 HC RX MED GY IP 250 OP 250 PS 637: Performed by: PHYSICIAN ASSISTANT

## 2022-01-13 PROCEDURE — 250N000013 HC RX MED GY IP 250 OP 250 PS 637: Performed by: INTERNAL MEDICINE

## 2022-01-13 PROCEDURE — 250N000011 HC RX IP 250 OP 636: Performed by: INTERNAL MEDICINE

## 2022-01-13 RX ORDER — SODIUM CHLORIDE 9 MG/ML
INJECTION, SOLUTION INTRAVENOUS
Status: DISCONTINUED
Start: 2022-01-13 | End: 2022-01-13 | Stop reason: HOSPADM

## 2022-01-13 RX ORDER — FOLIC ACID 1 MG/1
1 TABLET ORAL DAILY
Qty: 30 TABLET | Refills: 0 | Status: SHIPPED | OUTPATIENT
Start: 2022-01-13

## 2022-01-13 RX ORDER — AMLODIPINE BESYLATE 5 MG/1
5 TABLET ORAL DAILY
Qty: 30 TABLET | Refills: 0 | Status: SHIPPED | OUTPATIENT
Start: 2022-01-14

## 2022-01-13 RX ORDER — LANOLIN ALCOHOL/MO/W.PET/CERES
100 CREAM (GRAM) TOPICAL DAILY
Qty: 14 TABLET | Refills: 0 | Status: SHIPPED | OUTPATIENT
Start: 2022-01-14

## 2022-01-13 RX ADMIN — MULTIPLE VITAMINS W/ MINERALS TAB 1 TABLET: TAB at 09:29

## 2022-01-13 RX ADMIN — SERTRALINE HYDROCHLORIDE 50 MG: 50 TABLET ORAL at 09:29

## 2022-01-13 RX ADMIN — BACLOFEN 10 MG: 10 TABLET ORAL at 09:29

## 2022-01-13 RX ADMIN — HYDROCHLOROTHIAZIDE 25 MG: 25 TABLET ORAL at 09:28

## 2022-01-13 RX ADMIN — ENOXAPARIN SODIUM 40 MG: 40 INJECTION SUBCUTANEOUS at 09:32

## 2022-01-13 RX ADMIN — THIAMINE HCL TAB 100 MG 100 MG: 100 TAB at 09:29

## 2022-01-13 RX ADMIN — ENALAPRIL MALEATE 40 MG: 20 TABLET ORAL at 09:29

## 2022-01-13 RX ADMIN — AMLODIPINE BESYLATE 5 MG: 5 TABLET ORAL at 09:29

## 2022-01-13 RX ADMIN — HYDROXYZINE HYDROCHLORIDE 25 MG: 25 TABLET, FILM COATED ORAL at 09:29

## 2022-01-13 RX ADMIN — HYDROXYZINE HYDROCHLORIDE 25 MG: 25 TABLET, FILM COATED ORAL at 16:04

## 2022-01-13 RX ADMIN — FOLIC ACID 1 MG: 1 TABLET ORAL at 09:29

## 2022-01-13 RX ADMIN — IBUPROFEN 200 MG: 200 TABLET, FILM COATED ORAL at 16:04

## 2022-01-13 ASSESSMENT — ACTIVITIES OF DAILY LIVING (ADL)
ADLS_ACUITY_SCORE: 8

## 2022-01-13 NOTE — PROGRESS NOTES
Brief Social Work Note    Expected Discharge Date:  1/13/2022     Concerns to be Addressed:    Discharge disposition    Additional Information:    SW received a page from EULA Chavis regarding pt's discharge disposition as it had changed from the time previous SW note was signed.    Pt discharged to [facility], [address] [place] [address] ([phone]). RN reported that [facility] is aware that pt has asymptomatic COVID poistive.    SW contacted facility and spoke with Bud who verified that pt had arrived at the [facility] and was settling in.     DEVAUHGN will continue to follow as needed.    UMESH Solis, [facility]  ED/OBS   [facility]  Phone: [phone]  Pager: [phone]  Fax: [phone]     On-call pager, [phone], 4:00 pm to midnight

## 2022-01-13 NOTE — PLAN OF CARE
"      VS:   BP (!) 179/83 (BP Location: Right arm)   Pulse 76   Temp 97.4  F (36.3  C) (Oral)   Resp 20   Ht 1.626 m (5' 4\")   Wt 110.9 kg (244 lb 8 oz)   SpO2 95%   BMI 41.97 kg/m       Output:   Voiding spontaneously. LBM yesterday     Activity:   Up with walker in room.   Skin: Intact   Pain:   C/o chronic hip pain.   Neuro/CMS:   A/ox4 CMS intact. Numbness present in left fingers per Pt this is baseline   Dressing(s):   NOne.    Diet:   Regular tolerating   LDA:   PIV SL   Equipment:   Call light   Plan:   Planning for inpatient services   Additional Info:          "

## 2022-01-13 NOTE — PLAN OF CARE
"BP (!) 150/67 (BP Location: Right arm, Patient Position: Sitting)   Pulse 83   Temp 98.2  F (36.8  C) (Oral)   Resp 16   Ht 1.626 m (5' 4\")   Wt 110.9 kg (244 lb 8 oz)   SpO2 98%   BMI 41.97 kg/m    Patient is A&Ox4, calls appropriately for assistance, makes needs known, uses call light. VSS, LS clear. Pain controlled with Tylenol/Ibuprofen.  CIWA scores low.  No w/d symptoms noted. LBM 01/12.  Continent. Tolerating regular diet. Denies dizziness, SOB & CP. Continue POC.    "

## 2022-01-13 NOTE — PROGRESS NOTES
Care Management Follow Up    Length of Stay (days): 2    Expected Discharge Date: 01/13/2022     Concerns to be Addressed:       Patient plan of care discussed at interdisciplinary rounds: Yes    Anticipated Discharge Disposition:       Anticipated Discharge Services:    Anticipated Discharge DME:      Patient/family educated on Medicare website which has current facility and service quality ratings:    Education Provided on the Discharge Plan:    Patient/Family in Agreement with the Plan:      Referrals Placed by CM/SW:    Private pay costs discussed: Not applicable    Additional Information:  SW called Pt, who indicated that she wants to now got to [facility]. She asked SW to call [email or web address] at [phone] and fax information over to [phone]. DEVAUGHN lft a message asking facility what they need for admission consideration. Also, SW called [facility] to see if there was a Rule 25 that was done.     [facility] stated that since Pt initially rejected IPTx, she also refused a Rule 25. [facility] stated that it is possible for the facility, [facility] to do the assessment upon admission. DEVAUGHN will make request for [facility] to do the assessment when they call SW back and if she is admitted.     SW will continue to f/u with discharge planning for Pt.     Addendum: SW received call back from [email or web address] at [phone] who stated that Pt is not appropriate for the Tx there d/t being a previous resident, didn't want to participate in program and feels that her needs are more complex. They are recommending that hospital call [facility], which is a center that is more focused on Mental Health in [place] @ [phone]. DEVAUGHN called to find out what admission requirements are. SW had to leave [facility] and will try again tomorrow.     SW to f/u with discharge planning.      Corrina Guido, ROBINW, LSW  [facility]   PH#: [phone]  Pager#: [phone]  [facility]  [facility]

## 2022-01-13 NOTE — DISCHARGE SUMMARY
Discharge Summary    Daniela Moses MRN# [id number]   YOB: 1965 Age: 56 year old     Date of Admission:  1/10/2022  Date of Discharge:  1/13/2022  Admitting Physician:  Duy Hale MD  Discharge Physician:  Sorin Maravilla MD   Discharging Service:  Internal Medicine     Primary Provider: No Ref-Primary, Physician          Discharge Diagnosis:   Asymptomatic COVID 19 infection (+ve on 01/10)  Alcohol dependency with acute withdrawal,   Anxiety, depression, insomnia  HTN   Hypokalemia   Tachycardia   Transaminitis   Chronic pain              Brief History of Illness:     Daniela Moses is a 56 year old female who was admitted for alcohol detoxifcation and found to have COVID 19     For more details, please refer to the admission H&P on 1/10/2022             Hospital Course:          Daniela Moses is a 56 year old female with history of alcohol dependency, HTN, depression and anxiety who was admitted to [facility] ED observation 1/10 for alcohol detoxification in the setting of asymptomatic COVID 19 infection. Patient transferred to medicine 1/11 for ongoing care     # Asymptomatic COVID 19 infection (+ve on 01/10): Not vaccinated  - No indication for COVID specific treatment  - Isolation for 10 days from 01/10 if not symptomatic.      # Alcohol dependency with acute withdrawal, anxiety, depression, insomnia: Drinking 3 bottles of wine plus liquor PTA. Etoh 0.41 on presentation. Reports worsening depression but denies SI. PTA on Sertraline, Trazodone, and hydroxyzine   Patient was evaluated by Psychiatry service on 01/12. Appreciate recommendations.   She was seen by CD service, and   She was treated with Valium per CIWA, Thiamine, Folic acid. She has completed her detoxification  - Follow up with outpatient CD treatment  - Abstain from alcohol        # HTN: PTA on enalapril, amlodipine, and hydrochlorothiazide. BP 140s-200s/80s-100s in the setting of eoth withdrawal  BP was on the  higher side, she was started on Amlodipine 5 mg on 01/12 AM.   - Continue Amlodipine, Enalapril, and hydrochlorothiazide  - Hopefully BP will better controlled with alcohol withdrawal treatment as well.   - Follow up with primary care     Hypokalemia: Replace per protocols.      Tachycardia: HR 80s-110s in the setting of withdrawal. EKG NSR. Asymptomatic     Transaminitis: Mild. AST 70, ALT 51, ALP 89, Tbili 0.3 in the setting of etoh withdrawal  Resolved.      Chronic pain: Continue PTA gabapentin, baclofen     Diet:   Regular diet   DVT Prophylaxis: Enoxaparin (Lovenox) SQ  Aguilar Catheter: Not present  Central Lines: None  Code Status:   Full Code        Discharge Medications:     Current Discharge Medication List      START taking these medications    Details   amLODIPine (NORVASC) 5 MG tablet Take 1 tablet (5 mg) by mouth daily  Qty: 30 tablet, Refills: 0    Associated Diagnoses: Hypertension, unspecified type         CONTINUE these medications which have CHANGED    Details   folic acid (FOLVITE) 1 MG tablet Take 1 tablet (1 mg) by mouth daily  Qty: 30 tablet, Refills: 0    Associated Diagnoses: Alcohol withdrawal syndrome with complication (H)      thiamine (B-1) 100 MG tablet Take 1 tablet (100 mg) by mouth daily  Qty: 14 tablet, Refills: 0    Associated Diagnoses: Alcohol withdrawal syndrome without complication (H)         CONTINUE these medications which have NOT CHANGED    Details   baclofen (LIORESAL) 10 MG tablet Take 1 tablet (10 mg) by mouth 3 times daily  Qty: 90 tablet, Refills: 0    Associated Diagnoses: Chronic pain syndrome      enalapril (VASOTEC) 20 MG tablet Take 2 tablets (40 mg) by mouth daily  Qty: 60 tablet, Refills: 0    Associated Diagnoses: Primary hypertension      gabapentin (NEURONTIN) 300 MG capsule Take 2 capsules (600 mg) by mouth At Bedtime  Qty: 60 capsule, Refills: 0    Associated Diagnoses: Chronic pain syndrome      hydrochlorothiazide (HYDRODIURIL) 25 MG tablet Take 1 tablet  "(25 mg) by mouth daily  Qty: 30 tablet, Refills: 0    Associated Diagnoses: Primary hypertension      hydrOXYzine (ATARAX) 25 MG tablet Take 1 tablet (25 mg) by mouth 2 times daily as needed for anxiety  Qty: 60 tablet, Refills: 0    Associated Diagnoses: Mild episode of recurrent major depressive disorder (H)      multivitamin w/minerals (THERA-VIT-M) tablet Take 1 tablet by mouth daily  Qty: 30 tablet, Refills: 0    Associated Diagnoses: Alcohol withdrawal syndrome with complication (H)      sertraline (ZOLOFT) 50 MG tablet Take 1 tablet (50 mg) by mouth daily  Qty: 30 tablet, Refills: 0    Associated Diagnoses: Mild episode of recurrent major depressive disorder (H)      traZODone (DESYREL) 50 MG tablet Take 1 tablet (50 mg) by mouth nightly as needed for sleep  Qty: 30 tablet, Refills: 0    Associated Diagnoses: Mild episode of recurrent major depressive disorder (H)         STOP taking these medications       acetaminophen (TYLENOL) 500 MG tablet Comments:   Reason for Stopping:         capsaicin (ZOSTRIX) 0.025 % external cream Comments:   Reason for Stopping:         celecoxib (CELEBREX) 100 MG capsule Comments:   Reason for Stopping:                   Procedures/Consultations:   No procedures performed during this admission  Consultation during this admission received from psychiatry, CD,SW           Final Day of Progress before Discharge:     Reports doing well  No nausea, vomiting  No chest pain, shortness of breath  Would like to talk to SW    Physical Exam:  Blood pressure (!) 179/83, pulse 76, temperature 97.4  F (36.3  C), temperature source Oral, resp. rate 20, height 1.626 m (5' 4\"), weight 110.9 kg (244 lb 8 oz), SpO2 95 %.      General: Alert, awake, and oriented  HEENT:NO icterus, no pallor  CVS/Chest:S1, S2 normal.   Respiratory/Chest:B/L CTA  GI/Abdomen/: Soft, NT, BS+  Extremities:  Others:    Data:  All laboratory data reviewed             Condition on Discharge:   Discharge condition: " Stable   Code status on discharge: Full Code                Discharge Disposition:   Discharged to home               Pending Results:   Unresulted Labs Ordered in the Past 30 Days of this Admission     No orders found from 12/11/2021 to 1/11/2022.                  Discharge Instructions and Follow-Up:     Discharge Procedure Orders   COVID-19 GetWell Loop Referral   Referral Priority: Routine: Next available opening Referral Type: Consultation   Number of Visits Requested: 1     Adult [facility] Follow-up and recommended labs and tests   Order Comments: Follow up with primary care provider, Physician No Ref-Primary, within 7 days for hospital follow- up.  The following labs/tests are recommended: CBC, BMP.      Appointments on [facility] and/or [facility] (with [facility] or [facility] provider or service). Call [phone] if you haven't heard regarding these appointments within 7 days of discharge.     Activity   Order Comments: Your activity upon discharge: activity as tolerated     Order Specific Question Answer Comments   Is discharge order? Yes      Discharge - Quarantine/Isolation Instruction   Order Comments: Date of first positive test:  01/10/2021  If you tested positive COVID-19 and show symptoms (fever, cough, body aches or trouble breathing):        Stay home and away from others (self-isolate) until:        At least 10 days have passed since your symptoms started. AND...        You've had no fever-and no medicine that reduces fever for 1 full day (24 hours). AND...         Your other symptoms have resolved (gotten better).  If you tested positive for COVID-19 but don't show symptoms:       Stay home and away from others (self-isolate) until at least 10 days have passed since the date of your first positive COVID-19 test.     Reason for your hospital stay   Order Comments: Admitted for alcohol withdrawal syndrome,and found to be COVID 19 positive     Diet   Order Comments: Follow this diet upon discharge:  Orders Placed This Encounter      Combination Diet Regular Diet Adult     Order Specific Question Answer Comments   Is discharge order? Yes           Attestation:  Sorin Maravilla MD.    Time spent on patient: 60 minutes total including face to face and coordinating care time reviewing current illness, any medication changes, and the care plan for today.

## 2022-01-13 NOTE — PLAN OF CARE
Discharge : [facility]  : address : [address], [address] [place]  ) , [facility] is aware that pt has asymptomatic  COVID poistive.    Discharge instruction and meds given to pt.  Pt verbalized understanding of  discharge and follow-up plan and signed on AVS.  PIV access discontinued.   Pt left unit with all belongings by w/c at  17:00   A staff of  the [facility]  will pick pt up at the main entrance.     Evening SW was  called and updated about pt's discharge.

## 2022-01-14 ENCOUNTER — PATIENT OUTREACH (OUTPATIENT)
Dept: CARE COORDINATION | Facility: CLINIC | Age: 57
End: 2022-01-14
Payer: COMMERCIAL

## 2022-01-14 DIAGNOSIS — Z71.89 OTHER SPECIFIED COUNSELING: ICD-10-CM

## 2022-01-14 NOTE — PROGRESS NOTES
Clinic Care Coordination Contact  [facility]: Post-Discharge Note  SITUATION                                                      Admission:    Admission Date: 01/10/22   Reason for Admission: Asymptomatic COVID 19 infection (+ve on 01/10)Alcohol dependency with acute withdrawal, Anxiety, depression, insomniaHTN Hypokalemia Tachycardia Transaminitis Chronic pain  Discharge:   Discharge Date: 01/13/22  Discharge Diagnosis: Asymptomatic COVID 19 infection (+ve on 01/10)Alcohol dependency with acute withdrawal, Anxiety, depression, insomniaHTN Hypokalemia Tachycardia Transaminitis Chronic pain    BACKGROUND                                                      Daniela Moses is a 56 year old female with history of alcohol dependency, HTN, depression and anxiety who was admitted to [facility] ED observation 1/10 for alcohol detoxification in the setting of asymptomatic COVID 19 infection. Patient transferred to medicine 1/11 for ongoing care     # Asymptomatic COVID 19 infection (+ve on 01/10): Not vaccinated  - No indication for COVID specific treatment  - Isolation for 10 days from 01/10 if not symptomatic.      # Alcohol dependency with acute withdrawal, anxiety, depression, insomnia: Drinking 3 bottles of wine plus liquor PTA. Etoh 0.41 on presentation. Reports worsening depression but denies SI. PTA on Sertraline, Trazodone, and hydroxyzine   Patient was evaluated by Psychiatry service on 01/12. Appreciate recommendations.   She was seen by CD service, and   She was treated with Valium per CIWA, Thiamine, Folic acid. She has completed her detoxification  - Follow up with outpatient CD treatment  - Abstain from alcohol         # HTN: PTA on enalapril, amlodipine, and hydrochlorothiazide. BP 140s-200s/80s-100s in the setting of eoth withdrawal  BP was on the higher side, she was started on Amlodipine 5 mg on 01/12 AM.   - Continue Amlodipine, Enalapril, and hydrochlorothiazide  - Hopefully BP will  better controlled with alcohol withdrawal treatment as well.   - Follow up with primary care     Hypokalemia: Replace per protocols.      Tachycardia: HR 80s-110s in the setting of withdrawal. EKG NSR. Asymptomatic     Transaminitis: Mild. AST 70, ALT 51, ALP 89, Tbili 0.3 in the setting of etoh withdrawal  Resolved.      Chronic pain: Continue PTA gabapentin, baclofen     Diet:   Regular diet   DVT Prophylaxis: Enoxaparin (Lovenox) SQ  Aguilar Catheter: Not present  Central Lines: None  Code Status:   Full Code     ASSESSMENT      Enrollment  Primary Care Care Coordination Status: Unable to Reach    Discharge Assessment  How are you doing now that you are home?: Patient shares that she is doing good, just tired and hip is sore. Patient shares that she plans to call and schedule hip surgery. Patient shares that she feels like she is financially ok righ now and is working with employer on short-term disability. Patient declines resources, but will call me back if she thinks of anything. Thanks  for checking in.  How are your symptoms? (Red Flag symptoms escalate to triage hotline per guidelines): Improved  Do you feel your condition is stable enough to be safe at home until your provider visit?: Yes  Does the patient have their discharge instructions? : Yes  Does the patient have questions regarding their discharge instructions? : No  Were you started on any new medications or were there changes to any of your previous medications? : Yes  Does the patient have all of their medications?: Yes  Do you have questions regarding any of your medications? : No  Discharge follow-up appointment scheduled within 14 calendar days? : No  Is patient agreeable to assistance with scheduling? : No (Patient will call to get appointments scheduled)    Post-op (CHW CTA Only)  If the patient had a surgery or procedure, do they have any questions for a nurse?: No    Post-op (Clinicians Only)  Did the patient have surgery or a procedure:  No  Fever: No  Chills: No        PLAN                                                      Outpatient Plan:  Follow up with primary care provider, Physician No Ref-Primary, within 7 days for hospital follow- up.  The following labs/tests are recommended: CBC, BMP.         No future appointments.      For any urgent concerns, please contact our 24 hour nurse triage line: [phone] ([phone])         DEONDRE Aranda

## 2022-08-08 NOTE — PROGRESS NOTES
"SPIRITUAL HEALTH SERVICES   Spiritual Assessment Progress Note (Behavioral Health/CD Focus)  [facility] ([facility] 3AW    REFERRAL SOURCE: Consult placed for pt asking for spiritual/emotional support due to grief and life stressors.      On this visit, I met with Susannah in pt room. Shared introduction to spiritual care, provided grief and emotional support, exploration of spiritual/emotional resources. Shared words of blessing at the end of the visit.    EXPERIENCE OF ILLNESS/HOSPITALIZATION:  Susannah reflected on her relationship with her ex- and circumstances around his death, shared of a number of family stressors, her sense of feeling abandoned by family, her struggles with alcohol and desire to \"start a new life.\"    MEANING-MAKING:  Susannah summarized her situation in the words of a former : \" 'In life you are either heading into a storm, in the storm, or recovering from one.' I am in the middle of the storm right now. God is with me and I know this will get better but I certainly don't like where I am right now.\"    SPIRITUALITY/VALUES/Baptism:  Mu-ism.     COPING/SPIRITUAL PRACTICES: Positive coping resources that Susannah identified: taking drives in a car, music, going to work.    SUPPORT SYSTEMS: Susannah noted a number of estranged family relationships and that she feels very alone right now.    PLAN: I will follow up with Susannah 1-2x weekly.                                                                                                                                             Ann Marie Stewart MDiv  Associate   Pager [phone]  Office [phone]  [facility] remains available 24/7 for emergent requests/referrals, either by having the switchboard page the on-call  or by entering an ASAP/STAT consult in Epic (this will also page the on-call ). Routine Epic consults receive an initial response within 24 hours.    " Med Reconciliation

## 2022-10-03 ENCOUNTER — HEALTH MAINTENANCE LETTER (OUTPATIENT)
Age: 57
End: 2022-10-03

## 2023-02-11 ENCOUNTER — HEALTH MAINTENANCE LETTER (OUTPATIENT)
Age: 58
End: 2023-02-11

## 2023-10-22 ENCOUNTER — HEALTH MAINTENANCE LETTER (OUTPATIENT)
Age: 58
End: 2023-10-22

## 2024-03-09 ENCOUNTER — HEALTH MAINTENANCE LETTER (OUTPATIENT)
Age: 59
End: 2024-03-09